# Patient Record
Sex: MALE | Race: WHITE | NOT HISPANIC OR LATINO | Employment: FULL TIME | URBAN - METROPOLITAN AREA
[De-identification: names, ages, dates, MRNs, and addresses within clinical notes are randomized per-mention and may not be internally consistent; named-entity substitution may affect disease eponyms.]

---

## 2019-05-06 ENCOUNTER — OFFICE VISIT (OUTPATIENT)
Dept: URGENT CARE | Facility: CLINIC | Age: 49
End: 2019-05-06
Payer: COMMERCIAL

## 2019-05-06 VITALS
SYSTOLIC BLOOD PRESSURE: 118 MMHG | DIASTOLIC BLOOD PRESSURE: 72 MMHG | RESPIRATION RATE: 16 BRPM | OXYGEN SATURATION: 97 % | BODY MASS INDEX: 32.51 KG/M2 | HEART RATE: 81 BPM | WEIGHT: 240 LBS | HEIGHT: 72 IN | TEMPERATURE: 97.1 F

## 2019-05-06 DIAGNOSIS — M62.830 BACK MUSCLE SPASM: ICD-10-CM

## 2019-05-06 DIAGNOSIS — S39.012A LUMBAR STRAIN, INITIAL ENCOUNTER: ICD-10-CM

## 2019-05-06 PROCEDURE — 99203 OFFICE O/P NEW LOW 30 MIN: CPT | Mod: 25 | Performed by: NURSE PRACTITIONER

## 2019-05-06 RX ORDER — CYCLOBENZAPRINE HCL 10 MG
10 TABLET ORAL 3 TIMES DAILY PRN
Qty: 15 TAB | Refills: 0 | Status: SHIPPED | OUTPATIENT
Start: 2019-05-06 | End: 2019-08-12

## 2019-05-06 RX ORDER — METHYLPREDNISOLONE 4 MG/1
TABLET ORAL
Qty: 21 TAB | Refills: 0 | Status: SHIPPED | OUTPATIENT
Start: 2019-05-06 | End: 2019-08-12

## 2019-05-06 RX ORDER — KETOROLAC TROMETHAMINE 30 MG/ML
60 INJECTION, SOLUTION INTRAMUSCULAR; INTRAVENOUS ONCE
Status: COMPLETED | OUTPATIENT
Start: 2019-05-06 | End: 2019-05-06

## 2019-05-06 RX ADMIN — KETOROLAC TROMETHAMINE 60 MG: 30 INJECTION, SOLUTION INTRAMUSCULAR; INTRAVENOUS at 16:40

## 2019-05-06 ASSESSMENT — ENCOUNTER SYMPTOMS
NUMBNESS: 0
HEADACHES: 0
LEG PAIN: 0
ABDOMINAL PAIN: 0
FEVER: 0
PARESIS: 0
BOWEL INCONTINENCE: 0
BACK PAIN: 1
PERIANAL NUMBNESS: 0
PARESTHESIAS: 0
WEAKNESS: 0
WEIGHT LOSS: 0
TINGLING: 0

## 2019-05-06 ASSESSMENT — PATIENT HEALTH QUESTIONNAIRE - PHQ9: CLINICAL INTERPRETATION OF PHQ2 SCORE: 0

## 2019-05-06 NOTE — PROGRESS NOTES
Subjective:      Toro Graves is a 49 y.o. male who presents with Back Pain (x 2 weeks, possible pulled muscle)    Denies past medical, surgical or family history that is significant to today's problem.   RX or OTC medications-- reviewed with patient today.   Allergies   Allergen Reactions   • Pcn [Penicillins] Anaphylaxis     Per patient             Back Pain   This is a new problem. The current episode started in the past 7 days. The problem occurs constantly. The problem has been gradually worsening since onset. The pain is present in the lumbar spine. The quality of the pain is described as aching. The pain does not radiate. The pain is at a severity of 8/10. The pain is severe. The symptoms are aggravated by twisting. Pertinent negatives include no abdominal pain, bladder incontinence, bowel incontinence, chest pain, dysuria, fever, headaches, leg pain, numbness, paresis, paresthesias, pelvic pain, perianal numbness, tingling, weakness or weight loss. Risk factors include sedentary lifestyle and obesity (denies hx of cancer, recent surgery, trauma or illness). He has tried nothing for the symptoms.       Review of Systems   Constitutional: Negative for fever and weight loss.   Cardiovascular: Negative for chest pain.   Gastrointestinal: Negative for abdominal pain and bowel incontinence.   Genitourinary: Negative for bladder incontinence, dysuria and pelvic pain.   Musculoskeletal: Positive for back pain.   Neurological: Negative for tingling, weakness, numbness, headaches and paresthesias.          Objective:     /72 (BP Location: Left arm, Patient Position: Sitting, BP Cuff Size: Large adult)   Pulse 81   Temp 36.2 °C (97.1 °F) (Temporal)   Resp 16   Ht 1.829 m (6')   Wt 108.9 kg (240 lb)   SpO2 97%   BMI 32.55 kg/m²      Physical Exam   Constitutional: He is oriented to person, place, and time. Vital signs are normal. He appears well-developed and well-nourished. He is cooperative.   Non-toxic appearance. No distress.   HENT:   Head: Normocephalic.   Nose: Nose normal.   Neck: Normal range of motion.   Cardiovascular: Normal rate and regular rhythm.    Pulmonary/Chest: Effort normal and breath sounds normal. No respiratory distress.   Abdominal: Soft. Normal appearance. He exhibits no distension. There is no tenderness.   Musculoskeletal:        Back:         Legs:  Neurological: He is alert and oriented to person, place, and time. He has normal strength. No cranial nerve deficit or sensory deficit. Coordination and gait normal.   Reflex Scores:       Patellar reflexes are 2+ on the right side and 2+ on the left side.  Skin: Skin is warm and dry. Rash noted.   Psychiatric: He has a normal mood and affect. His speech is normal and behavior is normal. Thought content normal.   Nursing note and vitals reviewed.         Toradol given in clinic today. Tolerated well without adverse effects. Advised not to take NSAIDS for 6-8 hours post injection.        Assessment/Plan:     1. Lumbar strain, initial encounter    - MethylPREDNISolone (MEDROL DOSEPAK) 4 MG Tablet Therapy Pack; follow package directions  Dispense: 21 Tab; Refill: 0  - ketorolac (TORADOL) injection 60 mg; 2 mL by Intramuscular route Once.  - cyclobenzaprine (FLEXERIL) 10 MG Tab; Take 1 Tab by mouth 3 times a day as needed for Moderate Pain or Muscle Spasms.  Dispense: 15 Tab; Refill: 0    2. Back muscle spasm    - MethylPREDNISolone (MEDROL DOSEPAK) 4 MG Tablet Therapy Pack; follow package directions  Dispense: 21 Tab; Refill: 0  - ketorolac (TORADOL) injection 60 mg; 2 mL by Intramuscular route Once.  - cyclobenzaprine (FLEXERIL) 10 MG Tab; Take 1 Tab by mouth 3 times a day as needed for Moderate Pain or Muscle Spasms.  Dispense: 15 Tab; Refill: 0    Ice/ heat packs prn  Educated in proper administration of medication(s) ordered today including safety, possible SE, risks, benefits, rationale and alternatives to therapy.     Advised not  to drink ETOH, drive car or operate machinery while taking muscle relaxant RX . Verbalizes understanding of safety instructions.    OTC acetaminophen for breakthrough pain. Dosage and directions per . Do not exceed 3000 mg in 24 hours.     Return to clinic or PCP 5-7  days if current symptoms are not resolving in a satisfactory manner or sooner if new or worsening symptoms occur.   Patient was advised of signs and symptoms which would warrant further evaluation and /or emergent evaluation in ER.  Verbalized agreement with this treatment plan and seemed to understand without barriers. Questions were encouraged and answered to patients satisfaction.     Aftercare instructions were given to pt

## 2019-08-05 ENCOUNTER — TELEPHONE (OUTPATIENT)
Dept: MEDICAL GROUP | Facility: LAB | Age: 49
End: 2019-08-05

## 2019-08-05 NOTE — TELEPHONE ENCOUNTER
NEW PATIENT VISIT PRE-VISIT PLANNING    1.  [employer] Patient is checked in Patient Demographics? YES    2.  Immunizations were updated in [facility] using WebIZ?: Yes       •  Web Iz Recommendations: MMR , TDAP, VARICELLA (Chicken Pox)  and SHINGRIX (Shingles)    3.  Is this appointment scheduled as a Hospital Follow-Up? No    4.  Patient is due for the following Health Maintenance Topics:   Health Maintenance Due   Topic Date Due   • IMM DTaP/Tdap/Td Vaccine (1 - Tdap) 03/08/1988   • COLONOSCOPY  03/08/2019   • IMM ZOSTER VACCINES (1 of 2) 03/08/2019           5. Orders for overdue Health Maintenance topics pended in Pre-Charting? N\A    6.  Reviewed/Updated the following with patient:   •   Preferred Pharmacy? NO       •   Preferred Lab? NO       •   Preferred Communication? NO       •   Allergies? NO       •   Medications? NO       •   Social History? NO       •   Family History (document living status of immediate family members and if + hx of cancer, diabetes, hypertension, hyperlipidemia, heart attack, stroke) NO    7.  Updated Care Team?       •   DME Company (gait device, O2, CPAP, etc.) NO       •   Other Specialists (eye doctor, derm, GYN, cardiology, endo, etc): NO    8.  Patient was informed to arrive 15 min prior to their   scheduled appointment and bring in their medication bottles.

## 2019-08-05 NOTE — LETTER
[facility]  CHERELLE Walden  [address] [address]  [place] [address]  Fax: [phone]   Authorization for Release/Disclosure of   Protected Health Information   Name: MICHAEL CROOK : 1970 SSN: [id number]   Address: [address] [place] [address] [address] [place] [address] Phone:    [phone] (home)    I authorize the entity listed below to release/disclose the PHI below to:   [facility]/CHERELLE Walden   Provider or Entity Name:  Dr. Zi Webber   Address   [facility], [address]  [address] [place] [address] Phone:  [phone]    Fax:  [phone]   Reason for request: continuity of care   Information to be released:    [  ] LAST COLONOSCOPY,  including any PATH REPORT and follow-up  [  ] LAST FIT/COLOGUARD RESULT [  ] LAST DEXA  [  ] LAST MAMMOGRAM  [  ] LAST PAP  [  ] LAST LABS [  ] RETINA EXAM REPORT  [  ] IMMUNIZATION RECORDS  [XXX] Release all info      [  ] Check here and initial the line next to each item to release ALL health information INCLUDING  _____ Care and treatment for drug and / or alcohol abuse  _____ HIV testing, infection status, or AIDS  _____ Genetic Testing    DATES OF SERVICE OR TIME PERIOD TO BE DISCLOSED: _____________  I understand and acknowledge that:  * This Authorization may be revoked at any time by you in writing, except if your health information has already been used or disclosed.  * Your health information that will be used or disclosed as a result of you signing this authorization could be re-disclosed by the recipient. If this occurs, your re-disclosed health information may no longer be protected by State or Federal laws.  * You may refuse to sign this Authorization. Your refusal will not affect your ability to obtain treatment.  * This Authorization becomes effective upon signing and will  on (date) __________.      If no date is indicated, this Authorization will  one (1) year from the signature date.    Name: Michael  Cuauhtemoc Graves    Signature: Continuity of care. Patient has an upcoming appointment on 8/12/19. Thank you.    Date:     8/5/2019       PLEASE FAX REQUESTED RECORDS BACK TO: [phone]

## 2019-08-12 ENCOUNTER — TELEPHONE (OUTPATIENT)
Dept: MEDICAL GROUP | Facility: LAB | Age: 49
End: 2019-08-12

## 2019-08-12 ENCOUNTER — OFFICE VISIT (OUTPATIENT)
Dept: MEDICAL GROUP | Facility: LAB | Age: 49
End: 2019-08-12
Payer: COMMERCIAL

## 2019-08-12 VITALS
WEIGHT: 244 LBS | TEMPERATURE: 97.7 F | DIASTOLIC BLOOD PRESSURE: 66 MMHG | OXYGEN SATURATION: 95 % | HEART RATE: 76 BPM | RESPIRATION RATE: 16 BRPM | BODY MASS INDEX: 33.05 KG/M2 | HEIGHT: 72 IN | SYSTOLIC BLOOD PRESSURE: 110 MMHG

## 2019-08-12 DIAGNOSIS — G89.29 CHRONIC LEFT-SIDED BACK PAIN, UNSPECIFIED BACK LOCATION: ICD-10-CM

## 2019-08-12 DIAGNOSIS — R06.83 SNORING: ICD-10-CM

## 2019-08-12 DIAGNOSIS — M54.9 CHRONIC LEFT-SIDED BACK PAIN, UNSPECIFIED BACK LOCATION: ICD-10-CM

## 2019-08-12 DIAGNOSIS — Z12.11 SCREEN FOR COLON CANCER: ICD-10-CM

## 2019-08-12 DIAGNOSIS — E66.9 OBESITY (BMI 30-39.9): ICD-10-CM

## 2019-08-12 DIAGNOSIS — Z23 NEED FOR VACCINATION: ICD-10-CM

## 2019-08-12 DIAGNOSIS — J30.89 NON-SEASONAL ALLERGIC RHINITIS, UNSPECIFIED TRIGGER: ICD-10-CM

## 2019-08-12 PROCEDURE — 99214 OFFICE O/P EST MOD 30 MIN: CPT | Mod: 25 | Performed by: NURSE PRACTITIONER

## 2019-08-12 PROCEDURE — 90471 IMMUNIZATION ADMIN: CPT | Performed by: NURSE PRACTITIONER

## 2019-08-12 PROCEDURE — 90715 TDAP VACCINE 7 YRS/> IM: CPT | Performed by: NURSE PRACTITIONER

## 2019-08-12 RX ORDER — FLUTICASONE PROPIONATE 50 MCG
2 SPRAY, SUSPENSION (ML) NASAL DAILY
Qty: 1 BOTTLE | Refills: 11 | Status: SHIPPED | OUTPATIENT
Start: 2019-08-12 | End: 2019-10-14

## 2019-08-12 NOTE — ASSESSMENT & PLAN NOTE
This is a follow-up on a chronic problem. The current episode started 3 months ago. The problem occurs constantly. The problem has been  stable since onset. The pain is present in the lumbar spine more so on the left than the right. The quality of the pain is described as aching. The pain does not radiate. The pain is at a severity of 6/10. The symptoms are aggravated by sitting, walking twisting. Pertinent negatives include no abdominal pain, bladder incontinence, bowel incontinence, chest pain, dysuria, fever, headaches, leg pain, numbness, paresis, paresthesias, pelvic pain, perianal numbness, tingling, weakness or weight loss. Risk factors include sedentary lifestyle and obesity.  He has tried muscle relaxers with very little relief.  He has not tried using ice, heat, massage, acupuncture or chiropractic.

## 2019-08-12 NOTE — ASSESSMENT & PLAN NOTE
This is a chronic problem for which patient's wife is very concerned for.  She reports that he does have episodes where he will stop breathing in his sleep and that he snores so loudly he can be heard from the other room.  He does report daytime sleepiness and fatigue.  ROS is NEGATIVE for generalized weakness, headaches upon awakening, , dyspnea, tachypnea, respiratory distress, cyanotic skin changes.

## 2019-08-12 NOTE — ASSESSMENT & PLAN NOTE
This is a chronic problem for patient.  He reports that he has been struggling with his allergies.  ROS is positive for increased lacrimation, b/l itchy eyes,  rhinorrhea, post-nasal drip, sneezing.  He denies wheezing, dyspnea, respiratory distress, palpitations, tachycardia, generalized pruritis, rash/hives.  He does not take anything regularly but occasionally takes a Claritin.

## 2019-08-12 NOTE — TELEPHONE ENCOUNTER
• Cologuard order received from Natalia Quiles.     • All appropriate fields completed by Medical Assistant: Yes    • Paperwork faxed and scanned into her chart

## 2019-08-12 NOTE — PROGRESS NOTES
CC:Diagnoses of Chronic left-sided back pain, unspecified back location, Snoring, Non-seasonal allergic rhinitis, unspecified trigger, Obesity (BMI 30-39.9), Screen for colon cancer, and Need for vaccination were pertinent to this visit.    HISTORY OF PRESENT ILLNESS: Toro Graves is a 49 y.o. male established patient who presents today to discuss the following problems:    Chronic left-sided back pain  This is a follow-up on a chronic problem. The current episode started 3 months ago. The problem occurs constantly. The problem has been  stable since onset. The pain is present in the lumbar spine more so on the left than the right. The quality of the pain is described as aching. The pain does not radiate. The pain is at a severity of 6/10. The symptoms are aggravated by sitting, walking twisting. Pertinent negatives include no abdominal pain, bladder incontinence, bowel incontinence, chest pain, dysuria, fever, headaches, leg pain, numbness, paresis, paresthesias, pelvic pain, perianal numbness, tingling, weakness or weight loss. Risk factors include sedentary lifestyle and obesity.  He has tried muscle relaxers with very little relief.  He has not tried using ice, heat, massage, acupuncture or chiropractic.    Snoring  This is a chronic problem for which patient's wife is very concerned for.  She reports that he does have episodes where he will stop breathing in his sleep and that he snores so loudly he can be heard from the other room.  He does report daytime sleepiness and fatigue.  ROS is NEGATIVE for generalized weakness, headaches upon awakening, , dyspnea, tachypnea, respiratory distress, cyanotic skin changes.        Obesity (BMI 30-39.9)  Patient and I discussed the importance of lifestyle changes, with particular emphasis on decreasing sugar and carbohydrate intake and increasing plant-based nutrition (for the purposes of weight loss, general health, and prevention of chronic illnesses), as  well as regular cardiovascular exercise, proper sleep, and stress management. Patient verbalized understanding.      Non-seasonal allergic rhinitis  This is a chronic problem for patient.  He reports that he has been struggling with his allergies.  ROS is positive for increased lacrimation, b/l itchy eyes,  rhinorrhea, post-nasal drip, sneezing.  He denies wheezing, dyspnea, respiratory distress, palpitations, tachycardia, generalized pruritis, rash/hives.  He does not take anything regularly but occasionally takes a Claritin.        Health Maintenance: Completed    Patient Active Problem List    Diagnosis Date Noted   • Chronic left-sided back pain 08/12/2019   • Snoring 08/12/2019   • Non-seasonal allergic rhinitis 08/12/2019   • Obesity (BMI 30-39.9) 08/12/2019        Allergies:Pcn [penicillins]    Current Outpatient Medications   Medication Sig Dispense Refill   • fluticasone (FLONASE) 50 MCG/ACT nasal spray Spray 2 Sprays in nose every day. 1 Bottle 11     No current facility-administered medications for this visit.        Social History     Tobacco Use   • Smoking status: Former Smoker     Last attempt to quit: 2009     Years since quitting: 10.6   • Smokeless tobacco: Never Used   Substance Use Topics   • Alcohol use: No   • Drug use: No     Social History     Social History Narrative   • Not on file       Family History   Problem Relation Age of Onset   • No Known Problems Mother    • Diabetes Father    • Heart Disease Father    • No Known Problems Brother    • No Known Problems Brother        ROS:     No fevers or weight loss  No headaches or sore throat  No chest pain or shortness of breath  No bowel changes  No lower extremity edema  No suicidal ideation or panic attack          EXAM:   /66 (BP Location: Right arm, Patient Position: Sitting, BP Cuff Size: Adult)   Pulse 76   Temp 36.5 °C (97.7 °F) (Temporal)   Resp 16   Ht 1.829 m (6')   Wt 110.7 kg (244 lb)   SpO2 95%  Body mass index is  33.09 kg/m².    Gen:         Obese.  Alert and oriented, No apparent distress.  Neck:        No Lymphadenopathy or Bruits.  Lungs:     Clear to auscultation bilaterally  CV:          Regular rate and rhythm. No murmurs, rubs or gallops.               Ext:          No clubbing, cyanosis, edema.  SPINE: No significant spinal curvature on forward bend. Mild tenderness in paraspinous muscles lumbar spine without current spasm. SLT negative. DTR 2+ patella, 1+ achilles bilaterally. Strength 5/5 proximal and distal LE.  No pain with stress of SI. Full hip ROM. Poor hamstring flexibility. No symptoms with axial loading.              ASSESSMENT/PLAN:    1. Chronic left-sided back pain, unspecified back location  Chronic stable. No back red flags.Patient given conservative care instructions (NSAIDs, stretching, warm & cold compresses, OTC oral and topical analgesics, and avoiding aggravating factors)     - REFERRAL TO PHYSICAL THERAPY Reason for Therapy: Eval/Treat/Report  - DX-LUMBAR SPINE-2 OR 3 VIEWS; Future    2. Snoring  New to discuss.  Wife reporting significant periods of apnea during sleep. Symptomatic.   - REFERRAL TO SLEEP STUDIES  - CBC WITH DIFFERENTIAL; Future  - Comp Metabolic Panel; Future    3. Non-seasonal allergic rhinitis, unspecified trigger  Uncontrolled.  Patient given conservative care instructions regarding allergies (OTC antihistamines, nasal sprays [anti-histamines, steroids], nasal saline rinses, hydration, cautious use of nasal decongestants like pseudoephedrine).  Patient verbalizes understanding.    - fluticasone (FLONASE) 50 MCG/ACT nasal spray; Spray 2 Sprays in nose every day.  Dispense: 1 Bottle; Refill: 11    4. Obesity (BMI 30-39.9)  - Patient identified as having weight management issue.  Appropriate orders and counseling given.  - CBC WITH DIFFERENTIAL; Future  - Comp Metabolic Panel; Future  - HEMOGLOBIN A1C; Future  - Lipid Profile; Future    5. Screen for colon cancer  - COLOGROCIO  (FIT DNA)    6. Need for vaccination  - Tdap Vaccine =>6YO IM    Return in about 2 months (around 10/12/2019).       Please note that this dictation was created using voice recognition software. I have made every reasonable attempt to correct obvious errors, but I expect that there are errors of grammar and possibly content that I did not discover before finalizing the note.

## 2019-08-13 ENCOUNTER — HOSPITAL ENCOUNTER (OUTPATIENT)
Dept: LAB | Facility: MEDICAL CENTER | Age: 49
End: 2019-08-13
Attending: NURSE PRACTITIONER
Payer: COMMERCIAL

## 2019-08-13 DIAGNOSIS — E66.9 OBESITY (BMI 30-39.9): ICD-10-CM

## 2019-08-13 LAB
ALBUMIN SERPL BCP-MCNC: 4.2 G/DL (ref 3.2–4.9)
ALBUMIN/GLOB SERPL: 1.4 G/DL
ALP SERPL-CCNC: 87 U/L (ref 30–99)
ALT SERPL-CCNC: 28 U/L (ref 2–50)
ANION GAP SERPL CALC-SCNC: 8 MMOL/L (ref 0–11.9)
AST SERPL-CCNC: 19 U/L (ref 12–45)
BASOPHILS # BLD AUTO: 0.6 % (ref 0–1.8)
BASOPHILS # BLD: 0.04 K/UL (ref 0–0.12)
BILIRUB SERPL-MCNC: 0.5 MG/DL (ref 0.1–1.5)
BUN SERPL-MCNC: 12 MG/DL (ref 8–22)
CALCIUM SERPL-MCNC: 9.1 MG/DL (ref 8.5–10.5)
CHLORIDE SERPL-SCNC: 108 MMOL/L (ref 96–112)
CHOLEST SERPL-MCNC: 177 MG/DL (ref 100–199)
CO2 SERPL-SCNC: 23 MMOL/L (ref 20–33)
CREAT SERPL-MCNC: 1.03 MG/DL (ref 0.5–1.4)
EOSINOPHIL # BLD AUTO: 0.27 K/UL (ref 0–0.51)
EOSINOPHIL NFR BLD: 3.9 % (ref 0–6.9)
ERYTHROCYTE [DISTWIDTH] IN BLOOD BY AUTOMATED COUNT: 35.8 FL (ref 35.9–50)
EST. AVERAGE GLUCOSE BLD GHB EST-MCNC: 108 MG/DL
FASTING STATUS PATIENT QL REPORTED: NORMAL
GLOBULIN SER CALC-MCNC: 3.1 G/DL (ref 1.9–3.5)
GLUCOSE SERPL-MCNC: 104 MG/DL (ref 65–99)
HBA1C MFR BLD: 5.4 % (ref 0–5.6)
HCT VFR BLD AUTO: 44.4 % (ref 42–52)
HDLC SERPL-MCNC: 32 MG/DL
HGB BLD-MCNC: 15.1 G/DL (ref 14–18)
IMM GRANULOCYTES # BLD AUTO: 0.01 K/UL (ref 0–0.11)
IMM GRANULOCYTES NFR BLD AUTO: 0.1 % (ref 0–0.9)
LDLC SERPL CALC-MCNC: 101 MG/DL
LYMPHOCYTES # BLD AUTO: 0.96 K/UL (ref 1–4.8)
LYMPHOCYTES NFR BLD: 13.7 % (ref 22–41)
MCH RBC QN AUTO: 28.7 PG (ref 27–33)
MCHC RBC AUTO-ENTMCNC: 34 G/DL (ref 33.7–35.3)
MCV RBC AUTO: 84.4 FL (ref 81.4–97.8)
MONOCYTES # BLD AUTO: 0.53 K/UL (ref 0–0.85)
MONOCYTES NFR BLD AUTO: 7.6 % (ref 0–13.4)
NEUTROPHILS # BLD AUTO: 5.19 K/UL (ref 1.82–7.42)
NEUTROPHILS NFR BLD: 74.1 % (ref 44–72)
NRBC # BLD AUTO: 0 K/UL
NRBC BLD-RTO: 0 /100 WBC
PLATELET # BLD AUTO: 296 K/UL (ref 164–446)
PMV BLD AUTO: 9.2 FL (ref 9–12.9)
POTASSIUM SERPL-SCNC: 4.2 MMOL/L (ref 3.6–5.5)
PROT SERPL-MCNC: 7.3 G/DL (ref 6–8.2)
RBC # BLD AUTO: 5.26 M/UL (ref 4.7–6.1)
SODIUM SERPL-SCNC: 139 MMOL/L (ref 135–145)
TRIGL SERPL-MCNC: 219 MG/DL (ref 0–149)
WBC # BLD AUTO: 7 K/UL (ref 4.8–10.8)

## 2019-08-13 PROCEDURE — 36415 COLL VENOUS BLD VENIPUNCTURE: CPT

## 2019-08-13 PROCEDURE — 80061 LIPID PANEL: CPT

## 2019-08-13 PROCEDURE — 80053 COMPREHEN METABOLIC PANEL: CPT

## 2019-08-13 PROCEDURE — 83036 HEMOGLOBIN GLYCOSYLATED A1C: CPT

## 2019-08-13 PROCEDURE — 85025 COMPLETE CBC W/AUTO DIFF WBC: CPT

## 2019-08-18 ENCOUNTER — HOSPITAL ENCOUNTER (OUTPATIENT)
Dept: RADIOLOGY | Facility: MEDICAL CENTER | Age: 49
End: 2019-08-18
Attending: NURSE PRACTITIONER
Payer: COMMERCIAL

## 2019-08-18 DIAGNOSIS — G89.29 CHRONIC LEFT-SIDED BACK PAIN, UNSPECIFIED BACK LOCATION: ICD-10-CM

## 2019-08-18 DIAGNOSIS — M54.9 CHRONIC LEFT-SIDED BACK PAIN, UNSPECIFIED BACK LOCATION: ICD-10-CM

## 2019-08-18 PROCEDURE — 72100 X-RAY EXAM L-S SPINE 2/3 VWS: CPT

## 2019-10-11 ENCOUNTER — IMMUNIZATION (OUTPATIENT)
Dept: SOCIAL WORK | Facility: CLINIC | Age: 49
End: 2019-10-11
Payer: COMMERCIAL

## 2019-10-11 DIAGNOSIS — Z23 NEED FOR VACCINATION: ICD-10-CM

## 2019-10-11 PROCEDURE — 90471 IMMUNIZATION ADMIN: CPT | Performed by: REGISTERED NURSE

## 2019-10-11 PROCEDURE — 90686 IIV4 VACC NO PRSV 0.5 ML IM: CPT | Performed by: REGISTERED NURSE

## 2019-10-14 ENCOUNTER — OFFICE VISIT (OUTPATIENT)
Dept: MEDICAL GROUP | Facility: LAB | Age: 49
End: 2019-10-14
Payer: COMMERCIAL

## 2019-10-14 VITALS
DIASTOLIC BLOOD PRESSURE: 70 MMHG | SYSTOLIC BLOOD PRESSURE: 122 MMHG | TEMPERATURE: 98.4 F | RESPIRATION RATE: 18 BRPM | OXYGEN SATURATION: 97 % | WEIGHT: 243.8 LBS | HEIGHT: 72 IN | BODY MASS INDEX: 33.02 KG/M2 | HEART RATE: 72 BPM

## 2019-10-14 DIAGNOSIS — G89.29 CHRONIC LEFT-SIDED BACK PAIN, UNSPECIFIED BACK LOCATION: ICD-10-CM

## 2019-10-14 DIAGNOSIS — J30.89 NON-SEASONAL ALLERGIC RHINITIS, UNSPECIFIED TRIGGER: ICD-10-CM

## 2019-10-14 DIAGNOSIS — R06.83 SNORING: ICD-10-CM

## 2019-10-14 DIAGNOSIS — M54.9 CHRONIC LEFT-SIDED BACK PAIN, UNSPECIFIED BACK LOCATION: ICD-10-CM

## 2019-10-14 PROCEDURE — 99214 OFFICE O/P EST MOD 30 MIN: CPT | Performed by: NURSE PRACTITIONER

## 2019-10-14 NOTE — ASSESSMENT & PLAN NOTE
Patient is here today to follow up on his back pain.  Patient reports since his prior visit that his pain is much improved and in fact is mostly resolved.  He did do physical therapy which helped greatly and was prescribed home exercises.  He has been doing home exercises and these have been very beneficial.  He would like to continue using conservative measures and will follow-up if pain returns.

## 2019-10-14 NOTE — PROGRESS NOTES
CC:Diagnoses of Chronic left-sided back pain, unspecified back location, Snoring, and Non-seasonal allergic rhinitis, unspecified trigger were pertinent to this visit.    HISTORY OF PRESENT ILLNESS: Toro Graves is a 49 y.o. male established patient who presents today to discuss the following problems:    Chronic left-sided back pain  Patient is here today to follow up on his back pain.  Patient reports since his prior visit that his pain is much improved and in fact is mostly resolved.  He did do physical therapy which helped greatly and was prescribed home exercises.  He has been doing home exercises and these have been very beneficial.  He would like to continue using conservative measures and will follow-up if pain returns.    Snoring  There is a chronic problem for which patient has appt 11/25 for consultation with sleep studies.  He does have daytime sleepiness and fatigue.  Per the patient's wife, his snoring is very severe and can be heard from the other room.  He denies any generalized weakness, morning headaches, dyspnea, tachypnea, respiratory distress or cyanotic skin changes.      Non-seasonal allergic rhinitis  There is a chronic stable problem.  I had prescribed Flonase for him at his last visit but he reports that this actually caused headaches for him.  He has instead been taking Claritin regularly and symptoms are still poorly controlled.  His most bothersome symptom is rhinorrhea, postnasal drip and sneezing.  He denies any wheezing, dyspnea, respiratory distress, palpitations, tachycardia, generalized pruritus, rash or hives.      Health Maintenance: Completed    Patient Active Problem List    Diagnosis Date Noted   • Chronic left-sided back pain 08/12/2019   • Snoring 08/12/2019   • Non-seasonal allergic rhinitis 08/12/2019   • Obesity (BMI 30-39.9) 08/12/2019        Allergies:Pcn [penicillins]    No current outpatient medications on file.     No current facility-administered  medications for this visit.        Social History     Tobacco Use   • Smoking status: Former Smoker     Last attempt to quit: 2009     Years since quitting: 10.7   • Smokeless tobacco: Never Used   Substance Use Topics   • Alcohol use: No   • Drug use: No     Social History     Social History Narrative   • Not on file       Family History   Problem Relation Age of Onset   • No Known Problems Mother    • Diabetes Father    • Heart Disease Father    • No Known Problems Brother    • No Known Problems Brother        ROS:   No fevers or weight loss  No headaches or sore throat  No chest pain or shortness of breath  No bowel changes  No lower extremity edema  No suicidal ideation or panic attack        EXAM:   /70 (BP Location: Right arm, Patient Position: Sitting, BP Cuff Size: Adult)   Pulse 72   Temp 36.9 °C (98.4 °F) (Temporal)   Resp 18   Ht 1.829 m (6')   Wt 110.6 kg (243 lb 12.8 oz)   SpO2 97%  Body mass index is 33.07 kg/m².    Constitutional: Well-developed and well-nourished. Not diaphoretic. No distress.   Skin: Skin is warm and dry. No rash noted.  Head: Atraumatic without lesions.  Neck: Supple, trachea midline. No thyromegaly present. No cervical or supraclavicular lymphadenopathy.  Cardiovascular: Regular rate and rhythm.   Chest: Effort normal. Clear to auscultation throughout. No adventitious sounds.   Abdomen: Soft, non tender, and without distention. Active bowel sounds in all four quadrants. No rebound, guarding, masses or hepatosplenomegaly.  Extremities: No cyanosis, clubbing, erythema, nor edema.   Neurological: Alert and oriented x 3. Sensation intact.   Psychiatric:  Behavior, mood, and affect are appropriate.       ASSESSMENT/PLAN:    1. Chronic left-sided back pain, unspecified back location  Chronic improved with PT.  Continue home exercises. Patient given conservative care instructions (NSAIDs, stretching, warm & cold compresses, OTC oral and topical analgesics, and avoiding  aggravating factors) as well as instructions for stretches regarding back pain.      2. Snoring  Chronic uncontrolled. Appt in November with sleep studies.      3. Non-seasonal allergic rhinitis, unspecified trigger  Chronic. Uncontrolled.  Patient given conservative care instructions regarding allergies (OTC antihistamines, nasal sprays [anti-histamines, steroids], nasal saline rinses, hydration, cautious use of nasal decongestants like pseudoephedrine).  Patient verbalizes understanding.      Return in about 6 months (around 4/14/2020) for Routine Follow up.       Please note that this dictation was created using voice recognition software. I have made every reasonable attempt to correct obvious errors, but I expect that there are errors of grammar and possibly content that I did not discover before finalizing the note.

## 2019-10-14 NOTE — ASSESSMENT & PLAN NOTE
There is a chronic problem for which patient has appt 11/25 for consultation with sleep studies.  He does have daytime sleepiness and fatigue.  Per the patient's wife, his snoring is very severe and can be heard from the other room.  He denies any generalized weakness, morning headaches, dyspnea, tachypnea, respiratory distress or cyanotic skin changes.

## 2019-10-15 NOTE — ASSESSMENT & PLAN NOTE
There is a chronic stable problem.  I had prescribed Flonase for him at his last visit but he reports that this actually caused headaches for him.  He has instead been taking Claritin regularly and symptoms are still poorly controlled.  His most bothersome symptom is rhinorrhea, postnasal drip and sneezing.  He denies any wheezing, dyspnea, respiratory distress, palpitations, tachycardia, generalized pruritus, rash or hives.

## 2019-11-22 PROBLEM — Z87.891 FORMER SMOKER: Status: ACTIVE | Noted: 2019-11-22

## 2019-11-22 PROBLEM — Z92.29 UP TO DATE WITH INFLUENZA VACCINATION: Status: ACTIVE | Noted: 2019-11-22

## 2019-11-22 PROBLEM — G47.33 OSA (OBSTRUCTIVE SLEEP APNEA): Status: ACTIVE | Noted: 2019-11-22

## 2019-11-22 NOTE — PROGRESS NOTES
"CC: Evaluation of obstructive sleep apnea syndrome    HPI:  Mr. Toro Graves is a 49-year-old  kindly referred by CHERELLE Sexton for evaluation of obstructive sleep apnea syndrome.    He generally works 3 PM to 11:30 PM.  He has a regular bed partner.  His wife indicates that he snores loudly.  He did tell his PCP that he experienced daytime sleepiness and fatigue.  However, he denies being tired to me today.     His wife describes loud snoring, witnessed apneas, and resuscitative snorts for over 20 years.    Review of his sleep diary shows no napping.  He awakened feeling \"okay\" each and every morning.  He estimates that his sleep time varied between 7 to about 9 hours.    His total [place] score is a normal 1 out of 24    Significant comorbidities and modifying factors include back pain, bronchitis, nonseasonal allergic rhinitis, snoring, and up-to-date with influenza vaccination.      Patient Active Problem List    Diagnosis Date Noted   • MICHAEL (obstructive sleep apnea) 11/22/2019   • Former smoker 11/22/2019   • Up to date with influenza vaccination 11/22/2019   • Chronic left-sided back pain 08/12/2019   • Snoring 08/12/2019   • Non-seasonal allergic rhinitis 08/12/2019   • Obesity (BMI 30-39.9) 08/12/2019       Past Medical History:   Diagnosis Date   • Allergy    • Asthma    • Bronchitis         History reviewed. No pertinent surgical history.    Family History   Problem Relation Age of Onset   • No Known Problems Mother    • Diabetes Father    • Heart Disease Father    • No Known Problems Brother    • No Known Problems Brother        Social History     Socioeconomic History   • Marital status:      Spouse name: Not on file   • Number of children: Not on file   • Years of education: Not on file   • Highest education level: Not on file   Occupational History   • Not on file   Social Needs   • Financial resource strain: Not on file   • Food insecurity:     Worry: Not on file     " "Inability: Not on file   • Transportation needs:     Medical: Not on file     Non-medical: Not on file   Tobacco Use   • Smoking status: Former Smoker     Packs/day: 1.00     Years: 30.00     Pack years: 30.00     Types: Cigarettes     Last attempt to quit: 2009     Years since quitting: 10.9   • Smokeless tobacco: Never Used   Substance and Sexual Activity   • Alcohol use: No   • Drug use: No   • Sexual activity: Yes   Lifestyle   • Physical activity:     Days per week: Not on file     Minutes per session: Not on file   • Stress: Not on file   Relationships   • Social connections:     Talks on phone: Not on file     Gets together: Not on file     Attends Sikhism service: Not on file     Active member of club or organization: Not on file     Attends meetings of clubs or organizations: Not on file     Relationship status: Not on file   • Intimate partner violence:     Fear of current or ex partner: Not on file     Emotionally abused: Not on file     Physically abused: Not on file     Forced sexual activity: Not on file   Other Topics Concern   • Not on file   Social History Narrative   • Not on file       Current Outpatient Medications   Medication Sig Dispense Refill   • zolpidem (AMBIEN) 5 MG Tab Take 1 Tab by mouth at bedtime as needed for Sleep (1 to 2 po qhs prn insomnia/sleep study. Bring to sleep study.) for up to 2 doses. 2 Tab 0     No current facility-administered medications for this visit.     \"CURRENT RX\"    ALLERGIES: Pcn [penicillins]    ROS    Constitutional: Denies fever, chills, sweats,  weight loss, fatigue.  Eyes: Denies vision loss, pain, drainage, double vision, wears glasses.  Ears/Nose/Mouth/Throat: Denies earache, difficulty hearing, rhinitis/nasal congestion, injury, recurrent sore throat, persistent hoarseness, decayed teeth/toothaches, ringing or buzzing in the ears.  Cardiovascular: Denies chest pain, tightness, palpitations, swelling in legs/feet, fainting, difficulty breathing when " lying down but gets better when sitting up.   Respiratory: Denies shortness of breath, cough, sputum, wheezing, painful breathing, coughing up blood.   Sleep: per HPI  Gastrointestinal: Denies  difficulty swallowing, nausea, abdominal pain, diarrhea, constipation, heartburn.  Genitourinary: Denies  blood in urine, discharge, frequent urination.   Musculoskeletal: Denies painful joints, sore muscles, back pain.   Integumentary: Denies rashes, lumps, color changes.   Neurological: Denies frequent headaches,weakness, dizziness.    PHYSICAL EXAM  Obese    /70 (BP Location: Right arm, Patient Position: Sitting, BP Cuff Size: Adult)   Pulse 80   Resp 16   Ht 1.829 m (6')   Wt 113.9 kg (251 lb)   SpO2 96%   BMI 34.04 kg/m²   Appearance: Well-nourished, well-developed, no acute distress  Eyes:  PERRLA, EOMI; glasses  ENMT: without lesions, deformities;hearing grossly intact; tongue normal, posterior pharynx without erythema or exudate;   Modified Mallampati (AKA Coppola) Score: 2-3  Neck: Supple, trachea midline, no masses  Respiratory effort:  No intercostal retractions or use of accessory muscles  Lung auscultation:  No wheezes rhonchi rubs or rales  Cardiac: No murmurs, rubs, or gallops; regular rhythm, normal rate; no edema  Abdomen:  No tenderness, no organomegaly.  Obese  Musculoskeletal:  Grossly normal; gait and station normal; digits and nails normal  Skin:  No rashes, petechiae, cyanosis  Neurologic: without focal signs; oriented to person, time, place, and purpose; judgement intact  Psychiatric:  No depression, anxiety, agitation        PROBLEMS:  1. MICHAEL (obstructive sleep apnea)    - zolpidem (AMBIEN) 5 MG Tab; Take 1 Tab by mouth at bedtime as needed for Sleep (1 to 2 po qhs prn insomnia/sleep study. Bring to sleep study.) for up to 2 doses.  Dispense: 2 Tab; Refill: 0  - Polysomnography, 4 or More; Future    2. Obesity (BMI 30-39.9)    - OBESITY COUNSELING (No Charge): Patient identified as having  weight management issue.  Appropriate orders and counseling given.    3. Former smoker      4. Up to date with influenza vaccination      5. Chronic left-sided back pain, unspecified back location      PLAN:   The patient has signs and symptoms consistent with obstructive sleep apnea hypopnea syndrome. Will schedule to have a nocturnal polysomnogram using zolpidem to assist with sleep onset and maintenance should the need arise. Will return after the results are available to determine further diagnostic needs and/or treatment options.    The risks of untreated sleep apnea were discussed with the patient at length. Patients with MICHAEL are at increased risk of cardiovascular disease including coronary artery disease, systemic arterial hypertension, pulmonary arterial hypertension, cardiac arrythmias, and stroke. MICHAEL patients have an increased risk of motor vehicle accidents, type 2 diabetes, chronic kidney disease, and non-alcoholic liver disease. The patient was advised to avoid driving a motor vehicle when drowsy.    Positive airway pressure, such as CPAP, is considered first-line and preferred therapy for sleep apnea and may reverse both symptoms and risks.    Have advised the patient to follow up with the appropriate healthcare practitioners for all other medical problems and issues.      Return for after sleep study.

## 2019-11-25 ENCOUNTER — SLEEP CENTER VISIT (OUTPATIENT)
Dept: SLEEP MEDICINE | Facility: MEDICAL CENTER | Age: 49
End: 2019-11-25
Payer: COMMERCIAL

## 2019-11-25 VITALS
HEART RATE: 80 BPM | RESPIRATION RATE: 16 BRPM | HEIGHT: 72 IN | OXYGEN SATURATION: 96 % | SYSTOLIC BLOOD PRESSURE: 118 MMHG | WEIGHT: 251 LBS | DIASTOLIC BLOOD PRESSURE: 70 MMHG | BODY MASS INDEX: 34 KG/M2

## 2019-11-25 DIAGNOSIS — G47.33 OSA (OBSTRUCTIVE SLEEP APNEA): ICD-10-CM

## 2019-11-25 DIAGNOSIS — M54.9 CHRONIC LEFT-SIDED BACK PAIN, UNSPECIFIED BACK LOCATION: ICD-10-CM

## 2019-11-25 DIAGNOSIS — E66.9 OBESITY (BMI 30-39.9): ICD-10-CM

## 2019-11-25 DIAGNOSIS — G89.29 CHRONIC LEFT-SIDED BACK PAIN, UNSPECIFIED BACK LOCATION: ICD-10-CM

## 2019-11-25 DIAGNOSIS — Z87.891 FORMER SMOKER: ICD-10-CM

## 2019-11-25 DIAGNOSIS — Z92.29 UP TO DATE WITH INFLUENZA VACCINATION: ICD-10-CM

## 2019-11-25 PROCEDURE — 99244 OFF/OP CNSLTJ NEW/EST MOD 40: CPT | Performed by: INTERNAL MEDICINE

## 2019-11-25 RX ORDER — ZOLPIDEM TARTRATE 5 MG/1
5 TABLET ORAL NIGHTLY PRN
Qty: 2 TAB | Refills: 0 | Status: SHIPPED | OUTPATIENT
Start: 2019-11-25 | End: 2020-03-09

## 2019-12-29 ENCOUNTER — SLEEP STUDY (OUTPATIENT)
Dept: SLEEP MEDICINE | Facility: MEDICAL CENTER | Age: 49
End: 2019-12-29
Attending: INTERNAL MEDICINE
Payer: COMMERCIAL

## 2019-12-29 DIAGNOSIS — G47.33 OSA (OBSTRUCTIVE SLEEP APNEA): ICD-10-CM

## 2019-12-29 PROCEDURE — 95811 POLYSOM 6/>YRS CPAP 4/> PARM: CPT | Performed by: INTERNAL MEDICINE

## 2019-12-30 NOTE — PROCEDURES
Clinical Comments:    The patient underwent a split night polysomnogram with a CPAP titration using the standard montage for measurement of paramaters of sleep, respiratory events, movement abnormalities, heart rate and rhythm.  A Microphone was used to monitior snoring.         DIAGNOSTIC:  Analysis:  Study start time was 09:54:38 PM.  Total recording time was 2h 16.5m (136 minutes) with a total sleep time of 1h 57.5m (117 minutes) resulting in a sleep efficiency of 86.08%.  Sleep latency from the start fo the study was 02 minutes minutes and REM latency from sleep onset was 117 minutes minutes.  Respiratory:   There were 0 apneas in total consisting of 0 obstructive apneas, 0 mixed apneas, and 0 central apneas.  There were 32 hypopneas in total.  The apnea index was 0.00 per hour and the hypopnea index was 16.34 per hour.  The overall AHI was 16.3, with a REM AHI of 75.79, and a supine AHI of 16.34.  Limb Movements:  There were a total of 17 periodic leg movements, of which 11 were PLMS arousals.  This resulted in a PLMS index of 8.7 and a PLMS arousal index of 5.6  Oximetry:  The mean SaO2 was 94.0% for the diagnostic portion of the study, with a minimum SaO2 of 79.0%.             TREATMENT:  Analysis:  Treatment recording time was 5h 52.0m (352 minutes) with a total sleep time of 5h 7.5m (307 minutes) resulting in a sleep efficiency of 87.4%.    Sleep latency from the start of treatment was 27 minutes minutes and REM latency from sleep onset was 1h 13.5m minutes.    The patient had 50 arousals in total for an arousal index of 9.8.  Respiratory:   There were 3 apneas in total consisting of  1 obstructive apneas, 2 central apneas, and 0 mixed apneas for an apnea index of 0.59.    The patient had 12 hypopneas in total, which resulted in a hypopnea index of 2.34.    The overall AHI was 2.93, with a REM AHI of 8.22, and a supine AHI of 3.23.     Limb Movements:  There were a total of 0 periodic leg movements, of  which 0 were PLMS arousals.  This resulted in a PLMS index of 0.0 and a PLMS arousal index of 0.0.  Oximetry:  The mean SaO2 during treatment was 95.0%, with a minimum oxygen saturation of 86.0%.      Interpretation:    Mr. Graves presented with snoring and witnessed nocturnal apnea.  This is a split-night study.    In the diagnostic phase there is fragmentation of sleep but 9 minutes of REM sleep time are included.  There are frequent periodic limb movements and the periodic limb movement with arousal index is 4.6 events per hour.  Sleep onset latency is short, suggesting possible sleep deprivation.  The apnea hypopnea index is 16.3 events per hour, consisting exclusively of hypopnea episodes and all of the diagnostic study was done in the supine body position.  The lowest arterial oxygen saturation is 81% on room air and he spent 4% of the diagnostic time with a saturation below 90%.  The heart rate varied from 53-85 bpm.  Light snoring was noted.    In the treatment phase of the study there was improved continuity of sleep and 73 minutes of REM sleep time are included.  CPAP was applied at 5-9 cm water pressure with persistence of hypopnea events.  Very rare treatment onset central apnea episodes were noted.  BiPAP was applied at 12-13/8-9 cm water pressure.  In the final pressure stage the apnea hypopnea index was 1.3 events per hour with a lowest arterial oxygen saturation of 93% on room air.  That step in the titration included 16 minutes of REM sleep time and some supine body position time.    Assessment:  Mild to moderate obstructive sleep apnea hypopnea with an apnea hypopnea index of 16.3 events per hour and a lowest arterial oxygen saturation of 81% on room air.  Fragmentation of sleep related to the breathing events and to periodic limb movements with both improved on treatment.  There is an excellent response to BiPAP therapy.    Recommendations:  BiPAP at 13/9 cm water pressure.  He did best with a  medium Dreamware fullface mask.

## 2020-01-20 ENCOUNTER — SLEEP CENTER VISIT (OUTPATIENT)
Dept: SLEEP MEDICINE | Facility: MEDICAL CENTER | Age: 50
End: 2020-01-20
Payer: COMMERCIAL

## 2020-01-20 VITALS
WEIGHT: 249 LBS | SYSTOLIC BLOOD PRESSURE: 128 MMHG | BODY MASS INDEX: 33.72 KG/M2 | HEIGHT: 72 IN | HEART RATE: 84 BPM | OXYGEN SATURATION: 96 % | DIASTOLIC BLOOD PRESSURE: 62 MMHG

## 2020-01-20 DIAGNOSIS — G47.33 OSA (OBSTRUCTIVE SLEEP APNEA): ICD-10-CM

## 2020-01-20 DIAGNOSIS — E66.9 OBESITY (BMI 30-39.9): ICD-10-CM

## 2020-01-20 PROCEDURE — 99214 OFFICE O/P EST MOD 30 MIN: CPT | Performed by: NURSE PRACTITIONER

## 2020-01-20 NOTE — PATIENT INSTRUCTIONS
1) Start ** at **cmH20  2) Discussed acclimating to machine and change mask within first 30 days if required. Bring chip download to each appointment.  3) Light conditioning encouraged  4) Continue ** Encourage  smoking cessation   4) Vaccines: Up to date with **  5) Return in about 2 months (around 3/20/2020) for Compliance.

## 2020-01-20 NOTE — PROGRESS NOTES
CC:  Here for f/u sleep issues as listed below    HPI:   Toro presents today for follow up obstructive sleep apnea with sleep study results.  PMH of chronic pain.     PSG from 12/2019 indicated an AHI of 16.3 that increases to 75.8 in REM, and low oxygenation of 79%.  CPAP was applied at 5-9 cm water pressure with persistence of hypopnea events.  Very rare treatment onset central apnea episodes were noted.  BiPAP was applied at 12-13/8-9 cm water pressure. He did best on BIPAP 13/9 with an AHI of 1.3, mean oxygenation of 95%, REm rebound.     Reviewed results and treatment options including CPAP treatment versus in lab titration, dental appliance, UPPP surgery, and behavioral modifications.  Patient is amendable to BIPAP. They understand they may need a future sleep study if treatment is ineffective.   Patient is currently sleeping 7-9 hours per night with 0 nighttime awakenings. They have no trouble falling asleep.   They do feel refreshed in the morning and denies morning H/A. They feel tired throughout the day and denies naps.  Patient reports snoring. Denies apnea events and paroxysmal nocturnal dyspnea events. They have never fallen asleep in conversation, at the wheel, or at work.  They deny sleepwalking. Deny symptoms of restless leg. BMI is 33.         Patient Active Problem List    Diagnosis Date Noted   • MICHAEL (obstructive sleep apnea) 11/22/2019   • Former smoker 11/22/2019   • Up to date with influenza vaccination 11/22/2019   • Chronic left-sided back pain 08/12/2019   • Snoring 08/12/2019   • Non-seasonal allergic rhinitis 08/12/2019   • Obesity (BMI 30-39.9) 08/12/2019       Past Medical History:   Diagnosis Date   • Allergy    • Asthma    • Bronchitis        History reviewed. No pertinent surgical history.    Family History   Problem Relation Age of Onset   • No Known Problems Mother    • Diabetes Father    • Heart Disease Father    • No Known Problems Brother    • No Known Problems Brother         Social History     Socioeconomic History   • Marital status:      Spouse name: Not on file   • Number of children: Not on file   • Years of education: Not on file   • Highest education level: Not on file   Occupational History   • Not on file   Social Needs   • Financial resource strain: Not on file   • Food insecurity:     Worry: Not on file     Inability: Not on file   • Transportation needs:     Medical: Not on file     Non-medical: Not on file   Tobacco Use   • Smoking status: Former Smoker     Packs/day: 1.00     Years: 30.00     Pack years: 30.00     Types: Cigarettes     Last attempt to quit:      Years since quittin.0   • Smokeless tobacco: Never Used   Substance and Sexual Activity   • Alcohol use: No   • Drug use: No   • Sexual activity: Yes   Lifestyle   • Physical activity:     Days per week: Not on file     Minutes per session: Not on file   • Stress: Not on file   Relationships   • Social connections:     Talks on phone: Not on file     Gets together: Not on file     Attends Rastafarian service: Not on file     Active member of club or organization: Not on file     Attends meetings of clubs or organizations: Not on file     Relationship status: Not on file   • Intimate partner violence:     Fear of current or ex partner: Not on file     Emotionally abused: Not on file     Physically abused: Not on file     Forced sexual activity: Not on file   Other Topics Concern   • Not on file   Social History Narrative   • Not on file       Current Outpatient Medications   Medication Sig Dispense Refill   • zolpidem (AMBIEN) 5 MG Tab Take 1 Tab by mouth at bedtime as needed for Sleep (1 to 2 po qhs prn insomnia/sleep study. Bring to sleep study.) for up to 2 doses. (Patient not taking: Reported on 2020) 2 Tab 0     No current facility-administered medications for this visit.           Allergies: Pcn [penicillins]      ROS   Gen: Denies fever, chills, unintentional weight loss,  fatigue  Resp:Denies Dyspnea  CV: Denies chest pain, chest tightness  Sleep:Denies morning headache, insomnia,  gasping for air, apnea  Neuro: Denies frequent headaches, weakness, dizziness  See HPI.  All other systems reviewed and negative        Vital signs for this encounter:  Vitals:    01/20/20 1415   Height: 1.829 m (6')   Weight: 112.9 kg (249 lb)   Weight % change since last entry.: 0 %   BP: 128/62   Pulse: 84   BMI (Calculated): 33.77                   Physical Exam:   Gen:         Alert and oriented, No apparent distress.   Neck:        No Lymphadenopathy.  Lungs:     Clear to auscultation bilaterally.    CV:          Regular rate and rhythm. No murmurs, rubs or gallops.   Abd:         Soft non tender, non distended.            Ext:          No clubbing, cyanosis, edema.    Assessment   1. MICHAEL (obstructive sleep apnea)  DME BiPAP   2. Obesity (BMI 30-39.9)  OBESITY COUNSELING (No Charge): Patient identified as having weight management issue.  Appropriate orders and counseling given.       Patient is clinically stable and will proceed with following plan.     PLAN:   Patient Instructions   1) Start BIPAP at 13/9cmH20  2) Discussed acclimating to machine and change mask within first 30 days if required. Bring chip download to each appointment.  3) Light conditioning encouraged  4) Continue smoking cessation   4) Vaccines: Up to date with flu  5) Return in about 2 months (around 3/20/2020) for Compliance

## 2020-03-09 ENCOUNTER — OFFICE VISIT (OUTPATIENT)
Dept: MEDICAL GROUP | Facility: LAB | Age: 50
End: 2020-03-09
Payer: COMMERCIAL

## 2020-03-09 VITALS
WEIGHT: 251 LBS | BODY MASS INDEX: 34 KG/M2 | RESPIRATION RATE: 14 BRPM | DIASTOLIC BLOOD PRESSURE: 74 MMHG | OXYGEN SATURATION: 96 % | HEART RATE: 68 BPM | HEIGHT: 72 IN | SYSTOLIC BLOOD PRESSURE: 112 MMHG | TEMPERATURE: 97.8 F

## 2020-03-09 DIAGNOSIS — G47.33 OSA (OBSTRUCTIVE SLEEP APNEA): ICD-10-CM

## 2020-03-09 DIAGNOSIS — E78.2 MIXED DYSLIPIDEMIA: ICD-10-CM

## 2020-03-09 DIAGNOSIS — J30.89 NON-SEASONAL ALLERGIC RHINITIS, UNSPECIFIED TRIGGER: ICD-10-CM

## 2020-03-09 DIAGNOSIS — Z00.00 PREVENTATIVE HEALTH CARE: ICD-10-CM

## 2020-03-09 PROBLEM — R06.83 SNORING: Status: RESOLVED | Noted: 2019-08-12 | Resolved: 2020-03-09

## 2020-03-09 PROCEDURE — 99396 PREV VISIT EST AGE 40-64: CPT | Performed by: FAMILY MEDICINE

## 2020-03-09 ASSESSMENT — ENCOUNTER SYMPTOMS
BLURRED VISION: 0
DOUBLE VISION: 0
ABDOMINAL PAIN: 0
FEVER: 0
DIARRHEA: 0
CONSTIPATION: 0
VOMITING: 0
NAUSEA: 0
CHILLS: 0
SHORTNESS OF BREATH: 0
PALPITATIONS: 0
WHEEZING: 0

## 2020-03-09 ASSESSMENT — FIBROSIS 4 INDEX: FIB4 SCORE: 0.62

## 2020-03-09 ASSESSMENT — PATIENT HEALTH QUESTIONNAIRE - PHQ9: CLINICAL INTERPRETATION OF PHQ2 SCORE: 0

## 2020-03-09 NOTE — PROGRESS NOTES
Toro Graves is a 50 y.o. male here for   Chief Complaint   Patient presents with   • Establish Care     Kb pt, needing new PCP        HPI:  Toro is a very pleasant 50 y.o. male.     #MICHAEL  -Patient was diagnosed with sleep apnea approximately month ago, currently using CPAP.  States using CPAP every night.  Has not noticed had an acute difference in fatigue or sleep; however, does state that his wife has noted that he does not snore when using the CPAP.  -No other questions or concerns regarding MICHAEL at this time.     #Seasonal Allergies   -Patient states that he has a significant history of seasonal allergies.  He states that since moving to [place] about 15 years ago he continues to have allergies and allergy symptoms which include dry, scratchy eyes, runny nose, sneezing.  -Has previously used Flonase in the past medical however cough is giving him migraines.  -Continues to use antihistamines as needed for symptoms.     #Dislipidemia   -Diagnosed after a positive lipid panel and 8/3/2019 with an elevated LDL and triglycerides.  -Patient is currently working on increasing walking, increasing vegetables in his diet.  -No questions or concerns at this time.     #Preventative health:  -Discussed current health, cancer screenings: None needed.  Cologtaniya completed last year.  -Lipid profile completed on 8/3/2019.  Hemoglobin A1c completed 8/13/2018, 5.4%.  -Vaccinations needed: Zoster.  -Working on appropriate diet, walking a lot at work however no dedicated aerobic activity.#MICHAEL  -Patient was diagnosed with sleep apnea approximately month ago, currently using CPAP.  States using CPAP every night.  Has not noticed had an acute difference in fatigue or sleep; however, does state that his wife has noted that he does not snore when using the CPAP.  -No other questions or concerns regarding MICHAEL at this time.     #Seasonal Allergies   -Patient states that he has a significant history of seasonal allergies.   He states that since moving to [place] about 15 years ago he continues to have allergies and allergy symptoms which include dry, scratchy eyes, runny nose, sneezing.  -Has previously used Flonase in the past medical however cough is giving him migraines.  -Continues to use antihistamines as needed for symptoms.     #Dislipidemia   -Diagnosed after a positive lipid panel and 8/3/2019 with an elevated LDL and triglycerides.  -Patient is currently working on increasing walking, increasing vegetables in his diet.  -No questions or concerns at this time.     #Preventative health:  -Discussed current health, cancer screenings: None needed.  Cologuard completed last year.  -Lipid profile completed on 8/3/2019.  Hemoglobin A1c completed 2018, 5.4%.  -Vaccinations needed: Zoster.  -Working on appropriate diet, walking a lot at work however no dedicated aerobic activity.      Current medicines (including changes today)  Current Outpatient Medications   Medication Sig Dispense Refill   • zolpidem (AMBIEN) 5 MG Tab Take 1 Tab by mouth at bedtime as needed for Sleep (1 to 2 po qhs prn insomnia/sleep study. Bring to sleep study.) for up to 2 doses. (Patient not taking: Reported on 2020) 2 Tab 0     No current facility-administered medications for this visit.      He  has a past medical history of Allergy, Asthma, and Bronchitis.  He  has no past surgical history on file.  Social History     Tobacco Use   • Smoking status: Former Smoker     Packs/day: 1.00     Years: 30.00     Pack years: 30.00     Types: Cigarettes     Last attempt to quit: 2009     Years since quittin.1   • Smokeless tobacco: Never Used   Substance Use Topics   • Alcohol use: No   • Drug use: No     Social History     Social History Narrative   • Not on file     Family History   Problem Relation Age of Onset   • No Known Problems Mother    • Diabetes Father    • Heart Disease Father    • No Known Problems Brother    • No Known Problems Brother   "    Family Status   Relation Name Status   • Mo  Alive   • Fa     • Bro  Alive   • Bro  (Not Specified)         ROS  Review of Systems   Constitutional: Negative for chills and fever.   HENT: Negative for hearing loss.    Eyes: Negative for blurred vision and double vision.   Respiratory: Negative for shortness of breath and wheezing.    Cardiovascular: Negative for chest pain and palpitations.   Gastrointestinal: Negative for abdominal pain, constipation, diarrhea, nausea and vomiting.   Skin: Negative for rash.   All other systems reviewed and are negative.       Objective:     /74 (BP Location: Right arm, Patient Position: Sitting, BP Cuff Size: Adult)   Pulse 68   Temp 36.6 °C (97.8 °F) (Temporal)   Resp 14   Ht 1.829 m (6' 0.01\")   Wt 113.9 kg (251 lb)   SpO2 96%  Body mass index is 34.03 kg/m².  Physical Exam:    Constitutional: Alert, no distress.  Skin: Warm, dry, good turgor, no rashes in visible areas.  Eye: Equal, round and reactive, conjunctiva clear, lids normal.  ENMT: Lips without lesions, good dentition, oropharynx clear. TM's pearly gray with normal light reflexes bilaterally  Neck: Trachea midline, no masses, no thyromegaly. No cervical or supraclavicular lymphadenopathy.  Respiratory: Unlabored respiratory effort, lungs clear to auscultation bilaterally, no wheezes, rales, or ronchi.  Cardiovascular: Normal S1, S2, RRR, no murmur, no edema.  Abdomen: Soft, non-tender, no masses, no hepatosplenomegaly.  Psych: Alert and oriented x3, normal affect and mood.      Assessment and Plan:   The following treatment plan was discussed  1. Preventative health care  -All questions concerns were answered at this time.  -No routine screenings at this time.  -Vaccinations needed: Zoster.  Patient will follow-up at pharmacy.  -Last labs completed in 2019, no labs needed at this time.  -Continue with proper diet, exercise P  -Follow-up in 1 year for annual visit.    2. Non-seasonal allergic " rhinitis, unspecified trigger  -Status: Stable.  Patient will continue with over-the-counter medication at this point.  He is interested in possible allergy Kenalog shots at a future date.  We will follow-up when needed.    3. MICHAEL (obstructive sleep apnea)  -Status: Stable.  Continue use of CPAP.  -Follow-up with sleep medicine doctor as needed.    4. Mixed dyslipidemia  -The 10-year ASCVD risk score (Emmy FAM Jr., et al., 2013) is: 4.1%  -At this time will work on appropriate diet, exercise.  -We will recheck lipids in September.      Records requested.  Followup: Return in about 1 year (around 3/9/2021), or if symptoms worsen or fail to improve.         This note was created using voice recognition software. I have made every reasonable attempt to correct errors, however, I do anticipate some grammatical errors.

## 2020-03-28 NOTE — PROGRESS NOTES
CC: Clinical and compliance review    HPI:  Mr. Toro Graves is a 50-year-old  kindly referred by CHERELLE Sexton for evaluation of obstructive sleep apnea syndrome and seen as a new patient on 11/25/2019.  His sleep study performed 1/20/2020 showed an AHI of 16.3, a REM AHI of 75.8, and a greg saturation of 79%.  BiPAP was initiated at 13/9 cm water.    The patient reports improved symptoms using positive airway pressure.  Has experienced no significant problems with mask fit, mask leak, pressure tolerance, excessive airway dryness, nasal congestion, aerophagia, or chest pain.    Today, 3/30/2020, his 30-day compliance is 30% for 5 hours and 12 minutes.  He has an average residual estimated apnea hypopnea index of 1.8.  His median leak is 7.8 L/min his maximum leak is 34.1 L/min. Wife is already in bed when he gets home which keeps him from using it as he doesn't want to wake her up as she gets up early.      Significant comorbidities and modifying factors include back pain,  obesity, bronchitis, nonseasonal allergic rhinitis, snoring, former smoker and up-to-date with influenza vaccination.    Patient Active Problem List    Diagnosis Date Noted   • Mixed dyslipidemia 03/09/2020   • MICHAEL (obstructive sleep apnea) 11/22/2019   • Former smoker 11/22/2019   • Up to date with influenza vaccination 11/22/2019   • Chronic left-sided back pain 08/12/2019   • Non-seasonal allergic rhinitis 08/12/2019   • Obesity (BMI 30-39.9) 08/12/2019       Past Medical History:   Diagnosis Date   • Allergy    • Asthma    • Bronchitis         History reviewed. No pertinent surgical history.    Family History   Problem Relation Age of Onset   • No Known Problems Mother    • Diabetes Father    • Heart Disease Father    • No Known Problems Brother    • No Known Problems Brother        Social History     Socioeconomic History   • Marital status:      Spouse name: Not on file   • Number of children: Not on file  "  • Years of education: Not on file   • Highest education level: Not on file   Occupational History   • Not on file   Social Needs   • Financial resource strain: Not on file   • Food insecurity     Worry: Not on file     Inability: Not on file   • Transportation needs     Medical: Not on file     Non-medical: Not on file   Tobacco Use   • Smoking status: Former Smoker     Packs/day: 1.00     Years: 30.00     Pack years: 30.00     Types: Cigarettes     Last attempt to quit:      Years since quittin.2   • Smokeless tobacco: Never Used   Substance and Sexual Activity   • Alcohol use: No   • Drug use: No   • Sexual activity: Yes   Lifestyle   • Physical activity     Days per week: Not on file     Minutes per session: Not on file   • Stress: Not on file   Relationships   • Social connections     Talks on phone: Not on file     Gets together: Not on file     Attends Religion service: Not on file     Active member of club or organization: Not on file     Attends meetings of clubs or organizations: Not on file     Relationship status: Not on file   • Intimate partner violence     Fear of current or ex partner: Not on file     Emotionally abused: Not on file     Physically abused: Not on file     Forced sexual activity: Not on file   Other Topics Concern   • Not on file   Social History Narrative   • Not on file       No current outpatient medications on file.     No current facility-administered medications for this visit.     \"CURRENT RX\"    ALLERGIES: Pcn [penicillins]    ROS  Eyes: Wears glasses  Constitutional: Denies fever, chills, sweats,  weight loss, fatigue  Cardiovascular: Denies chest pain, tightness, palpitations, swelling in legs/feet, fainting, difficulty breathing when lying down but gets better when sitting up.   Respiratory: Denies shortness of breath, cough, sputum, wheezing, painful breathing, coughing up blood.   Sleep: per HPI  Gastrointestinal: Denies  difficulty swallowing, nausea, abdominal " pain, diarrhea, constipation, heartburn.  Musculoskeletal: Denies painful joints, sore muscles, back pain.          Melanocytic review venous  PHYSICAL EXAM  Obese    /72 (BP Location: Right arm, Patient Position: Sitting, BP Cuff Size: Adult)   Pulse 74   Resp 14   Ht 1.829 m (6')   Wt 113.9 kg (251 lb)   SpO2 97%   BMI 34.04 kg/m²   Appearance: Well-nourished, well-developed, no acute distress  Eyes:  PERRLA, EOMI  ENMT: without lesions, deformities;hearing grossly intact; tongue normal, posterior pharynx without erythema or exudate;   Modified Mallampati (AKA Freidman) Score: 2-3  Neck: Supple, trachea midline, no masses  Respiratory effort:  No intercostal retractions or use of accessory muscles  Lung auscultation:  No wheezes rhonchi rubs or rales  Cardiac: No murmurs, rubs, or gallops; regular rhythm, normal rate; no edema  Abdomen:  No tenderness, no organomegaly.  Obese  Musculoskeletal:  Grossly normal; gait and station normal; digits and nails normal  Skin:  No rashes, petechiae, cyanosis  Neurologic: without focal signs; oriented to person, time, place, and purpose; judgement intact  Psychiatric:  No depression, anxiety, agitation        PROBLEMS:    1. MICHAEL (obstructive sleep apnea)      2. Up to date with influenza vaccination      3. Obesity (BMI 30-39.9)    - OBESITY COUNSELING (No Charge): Patient identified as having weight management issue.  Appropriate orders and counseling given.    4. Non-seasonal allergic rhinitis, unspecified trigger      5. Mixed dyslipidemia      6. Former smoker        PLAN:   He was encouraged to use his device each and every night for at least 4 hours.    Has been advised to continue the current bilevel 13/9 cm water, clean equipment frequently, and get new mask and supplies as allowed by insurance and DME. Advised about potential problems including nasal obstruction/stuffiness, mask leak or discomfort , frequent awakenings, chill or dryness of the upper  airway, noise, inconvenience, and lack of benefit. Recommend an earlier appointment, if significant treatment barriers develop.    Have advised the patient to follow up with the appropriate healthcare practitioners for all other medical problems and issues.    Return in about 6 months (around 9/30/2020).

## 2020-03-30 ENCOUNTER — SLEEP CENTER VISIT (OUTPATIENT)
Dept: SLEEP MEDICINE | Facility: MEDICAL CENTER | Age: 50
End: 2020-03-30
Payer: COMMERCIAL

## 2020-03-30 VITALS
SYSTOLIC BLOOD PRESSURE: 128 MMHG | DIASTOLIC BLOOD PRESSURE: 72 MMHG | OXYGEN SATURATION: 97 % | BODY MASS INDEX: 34 KG/M2 | HEIGHT: 72 IN | RESPIRATION RATE: 14 BRPM | WEIGHT: 251 LBS | HEART RATE: 74 BPM

## 2020-03-30 DIAGNOSIS — G47.33 OSA (OBSTRUCTIVE SLEEP APNEA): ICD-10-CM

## 2020-03-30 DIAGNOSIS — Z92.29 UP TO DATE WITH INFLUENZA VACCINATION: ICD-10-CM

## 2020-03-30 DIAGNOSIS — Z87.891 FORMER SMOKER: ICD-10-CM

## 2020-03-30 DIAGNOSIS — E66.9 OBESITY (BMI 30-39.9): ICD-10-CM

## 2020-03-30 DIAGNOSIS — J30.89 NON-SEASONAL ALLERGIC RHINITIS, UNSPECIFIED TRIGGER: ICD-10-CM

## 2020-03-30 DIAGNOSIS — E78.2 MIXED DYSLIPIDEMIA: ICD-10-CM

## 2020-03-30 PROCEDURE — 99214 OFFICE O/P EST MOD 30 MIN: CPT | Performed by: INTERNAL MEDICINE

## 2020-03-30 ASSESSMENT — FIBROSIS 4 INDEX: FIB4 SCORE: 0.62

## 2020-08-31 ENCOUNTER — OFFICE VISIT (OUTPATIENT)
Dept: MEDICAL GROUP | Facility: LAB | Age: 50
End: 2020-08-31
Payer: COMMERCIAL

## 2020-08-31 ENCOUNTER — HOSPITAL ENCOUNTER (OUTPATIENT)
Dept: RADIOLOGY | Facility: MEDICAL CENTER | Age: 50
End: 2020-08-31
Attending: FAMILY MEDICINE
Payer: COMMERCIAL

## 2020-08-31 VITALS
HEIGHT: 72 IN | TEMPERATURE: 97.9 F | WEIGHT: 246 LBS | RESPIRATION RATE: 12 BRPM | OXYGEN SATURATION: 97 % | HEART RATE: 76 BPM | BODY MASS INDEX: 33.32 KG/M2 | SYSTOLIC BLOOD PRESSURE: 116 MMHG | DIASTOLIC BLOOD PRESSURE: 70 MMHG

## 2020-08-31 DIAGNOSIS — M79.672 LEFT FOOT PAIN: ICD-10-CM

## 2020-08-31 PROCEDURE — 99214 OFFICE O/P EST MOD 30 MIN: CPT | Performed by: FAMILY MEDICINE

## 2020-08-31 PROCEDURE — 73630 X-RAY EXAM OF FOOT: CPT | Mod: LT

## 2020-08-31 ASSESSMENT — ENCOUNTER SYMPTOMS
TINGLING: 0
SHORTNESS OF BREATH: 0
FEVER: 0
WHEEZING: 0
SENSORY CHANGE: 0
WEIGHT LOSS: 0
CHILLS: 0
PALPITATIONS: 0

## 2020-08-31 ASSESSMENT — FIBROSIS 4 INDEX: FIB4 SCORE: 0.62

## 2020-08-31 NOTE — PROGRESS NOTES
"Subjective:   Toro Graves is a 50 y.o. male here today for   Chief Complaint   Patient presents with   • Foot Pain     Heel of left foot in pain, x 2 months now.        #Left foot pain:  -Patient states her last 2 months has been experiencing pain in the bottom of his left foot.  -States the pain is located on the bottom/very back of his foot.  Pain is nonradiating.  It is worse when stepping down out of his car, worse when stepping out of bed in the morning.  He states after walking for some time it does improve.  Denies any recent injury, no previous medical history of problems with left foot.  He denies any swelling, redness, heat to touch.  -No other palliative/provocative factors noted.      Allergies   Allergen Reactions   • Pcn [Penicillins] Anaphylaxis     Per patient         Current medicines (including changes today)  No current outpatient medications on file.     No current facility-administered medications for this visit.      He  has a past medical history of Allergy, Asthma, and Bronchitis.    ROS   Review of Systems   Constitutional: Negative for chills, fever and weight loss.   Respiratory: Negative for shortness of breath and wheezing.    Cardiovascular: Negative for chest pain and palpitations.   Musculoskeletal: Negative for joint pain.   Neurological: Negative for tingling and sensory change.          Objective:     Physical Exam:  /70   Pulse 76   Temp 36.6 °C (97.9 °F) (Temporal)   Resp 12   Ht 1.829 m (6' 0.01\")   Wt 111.6 kg (246 lb)   SpO2 97%  Body mass index is 33.36 kg/m².   Const: Vitals above. Well-appearing.  CV: Inspected/palpated for lower extremity edema. Bilateral dorsalis pedis/posterior tibial pulses palpated, noting below if not 2+. Capillary refill evaluated, noting below if greater than 2 seconds.  Skin: Inspected for rash or skin lesions in area of concern.  Neuro/psych: Sensation to light touch evaluated at web space of great toe (peroneal nerve), " medial leg/foot (L4), lateral leg/dorsal foot (L5), lateral foot (S1). Observed for normal mood/affect/insight.  MSK: Evaluated gait, posture, weightbearing status. Examination of bilateral ankles:  - Inspected/palpated bilaterally for warmth, erythema/discoloration, effusion/swelling, deformity, and tenderness of the proximal fibula, malleolae, navicular and tarsal bones, talus (dome, lateral/posterior processes), calcaneus, 5th metatarsal and all other metatarsals, phalanges, peroneus brevis/longus tendons, Achilles tendon, anterior tibiofibular ligament, deltoid ligament, ATFL/CFL/PTFL, and Lisfranc ligament. Syndesmosis squeeze and Brand test were performed.  - Assessed passive/active range of motion bilaterally in dorsiflexion, plantar flexion, inversion, and eversion, noting below for pain or crepitation.  - Assessed muscle strength bilaterally in dorsiflexion, plantar flexion, inversion, and eversion, noting below if less than 5/5 or pain. Observed for muscle atrophy.  - Assessed stability bilaterally at ATFL (anterior drawer), CFL (talar tilt).    All of the above were found to be normal, except:   Tenderness palpation across the area of the PT FL.  Anterior drawer slightly positive.      Assessment and Plan:     1. Left foot pain  -Uncertain etiology of foot pain at this time.  Patient symptoms he describes today are consistent with also plantar fasciitis; however, physical examination was by with the exception of positive anterior drawer test which could point to an ATFL injury.  At this time we will begin treatment as if it is plantar fasciitis, will get foot x-rays, home physical therapy.  -Patient will continue use exercises for 2 to 3 weeks, will call if no improvement.  -We will call patient with results of foot x-ray.  - DX-FOOT-COMPLETE 3+ LEFT; Future    Followup: No follow-ups on file.         PLEASE NOTE: This dictation was created using voice recognition software. I have made every reasonable  attempt to correct obvious errors, but I expect that there are errors of grammar and possibly content that I did not discover before finalizing the note.

## 2020-09-02 ENCOUNTER — PATIENT MESSAGE (OUTPATIENT)
Dept: MEDICAL GROUP | Facility: LAB | Age: 50
End: 2020-09-02

## 2020-09-02 DIAGNOSIS — M77.30 BONE SPUR OF INFERIOR PORTION OF CALCANEUS, UNSPECIFIED LATERALITY: ICD-10-CM

## 2020-09-02 DIAGNOSIS — M72.2 PLANTAR FASCIITIS: ICD-10-CM

## 2020-09-26 NOTE — PROGRESS NOTES
CC: Follow-up for MICHAEL on BiPAP 13/9 cm water    HPI:  Toro Graves is a 50 y.o.male  employed by the [employer] kindly referred by CHERELLE Sexton and last seen 3/30/2020 for MICHAEL on BiPAP.  When last seen his compliance was excellent and his AHI was normalized. His sleep study performed 1/20/2020 showed an AHI of 16.3, a REM AHI of 75.8, and a greg saturation of 79%. BiPAP was initiated at 13/9 cm water.      Today, 9/28/2020, his 30-day compliance is 57% for 6 hours and 36 minutes.  He has an average residual apnea hypopnea index of 1.7.  His wife is already in bed when he gets home from work.  His wife gets up early herself to go to work.  On these days he is reluctant to use his BiPAP unit as he does not want to disturb his wife's sleep. Also, wears a mask all day at work and has trouble using another at night.    The patient reports improved symptoms using positive airway pressure.  Has experienced no significant problems with mask fit, mask leak, pressure tolerance, excessive airway dryness, nasal congestion, aerophagia, or chest pain.        Significant comorbidities and modifying factors include back pain, obesity, bronchitis, nonseasonal allergic rhinitis, snoring, and former smoker.        Patient Active Problem List    Diagnosis Date Noted   • Mixed dyslipidemia 03/09/2020   • MICHAEL (obstructive sleep apnea) 11/22/2019   • Former smoker 11/22/2019   • Up to date with influenza vaccination 11/22/2019   • Chronic left-sided back pain 08/12/2019   • Non-seasonal allergic rhinitis 08/12/2019   • Obesity (BMI 30-39.9) 08/12/2019       Past Medical History:   Diagnosis Date   • Allergy    • Asthma    • Bronchitis         History reviewed. No pertinent surgical history.    Family History   Problem Relation Age of Onset   • No Known Problems Mother    • Diabetes Father    • Heart Disease Father    • No Known Problems Brother    • No Known Problems Brother        Social History  "    Socioeconomic History   • Marital status:      Spouse name: Not on file   • Number of children: Not on file   • Years of education: Not on file   • Highest education level: Not on file   Occupational History   • Not on file   Social Needs   • Financial resource strain: Not on file   • Food insecurity     Worry: Not on file     Inability: Not on file   • Transportation needs     Medical: Not on file     Non-medical: Not on file   Tobacco Use   • Smoking status: Former Smoker     Packs/day: 1.00     Years: 30.00     Pack years: 30.00     Types: Cigarettes     Quit date:      Years since quittin.7   • Smokeless tobacco: Never Used   Substance and Sexual Activity   • Alcohol use: No   • Drug use: No   • Sexual activity: Yes   Lifestyle   • Physical activity     Days per week: Not on file     Minutes per session: Not on file   • Stress: Not on file   Relationships   • Social connections     Talks on phone: Not on file     Gets together: Not on file     Attends Oriental orthodox service: Not on file     Active member of club or organization: Not on file     Attends meetings of clubs or organizations: Not on file     Relationship status: Not on file   • Intimate partner violence     Fear of current or ex partner: Not on file     Emotionally abused: Not on file     Physically abused: Not on file     Forced sexual activity: Not on file   Other Topics Concern   • Not on file   Social History Narrative   • Not on file       No current outpatient medications on file.     No current facility-administered medications for this visit.     \"CURRENT RX\"    ALLERGIES: Pcn [penicillins]    ROS    Constitutional: Denies fever, chills, sweats,  weight loss, fatigue  Cardiovascular: Denies chest pain, tightness, palpitations, swelling in legs/feet, fainting, difficulty breathing when lying down but gets better when sitting up.   Respiratory: Denies shortness of breath, cough, sputum, wheezing, painful breathing, coughing up " "blood.   Sleep: per HPI  Gastrointestinal: Denies  difficulty swallowing, nausea, abdominal pain, diarrhea, constipation, heartburn.  Musculoskeletal: Denies painful joints, sore muscles, back pain.  Left foot bone spur pushing on achilles tendon, may need surgery.      PHYSICAL EXAM  Obese    /80 (BP Location: Right arm, Patient Position: Sitting, BP Cuff Size: Adult)   Pulse 60   Resp 16   Ht 1.854 m (6' 1\")   Wt 111.6 kg (246 lb)   SpO2 98%   BMI 32.46 kg/m²   Appearance: Well-nourished, well-developed, no acute distress  Eyes:  PERRLA, EOMI; glasses  ENMT: Mask on  Neck: Supple, trachea midline, no masses  Respiratory effort:  No intercostal retractions or use of accessory muscles  Lung auscultation:  No wheezes rhonchi rubs or rales  Cardiac: No murmurs, rubs, or gallops; regular rhythm, normal rate; no edema  Abdomen:  No tenderness, no organomegaly.  Obese  Musculoskeletal:  Grossly normal; gait and station normal; digits and nails normal  Skin:  No rashes, petechiae, cyanosis  Neurologic: without focal signs; oriented to person, time, place, and purpose; judgement intact  Psychiatric:  No depression, anxiety, agitation        PROBLEMS:    1. MICHAEL (obstructive sleep apnea)    2. Obesity (BMI 30-39.9)    3. Mixed dyslipidemia    4. Former smoker      PLAN:     Has been advised to continue the current BiPAP 13/9 cm water, clean equipment frequently, and get new mask and supplies as allowed by insurance and DME. Advised about potential problems including nasal obstruction/stuffiness, mask leak or discomfort , frequent awakenings, chill or dryness of the upper airway, noise, inconvenience, and lack of benefit. Recommend an earlier appointment, if significant treatment barriers develop.    Have advised the patient to follow up with the appropriate healthcare practitioners for all other medical problems and issues.    He was encouraged to use his BiPAP for as many days as possible for as long as " possible.    Return in about 1 year (around 9/28/2021).

## 2020-09-28 ENCOUNTER — SLEEP CENTER VISIT (OUTPATIENT)
Dept: SLEEP MEDICINE | Facility: MEDICAL CENTER | Age: 50
End: 2020-09-28
Payer: COMMERCIAL

## 2020-09-28 VITALS
WEIGHT: 246 LBS | HEART RATE: 60 BPM | DIASTOLIC BLOOD PRESSURE: 80 MMHG | OXYGEN SATURATION: 98 % | SYSTOLIC BLOOD PRESSURE: 120 MMHG | RESPIRATION RATE: 16 BRPM | BODY MASS INDEX: 32.6 KG/M2 | HEIGHT: 73 IN

## 2020-09-28 DIAGNOSIS — Z87.891 FORMER SMOKER: ICD-10-CM

## 2020-09-28 DIAGNOSIS — E78.2 MIXED DYSLIPIDEMIA: ICD-10-CM

## 2020-09-28 DIAGNOSIS — G47.33 OSA (OBSTRUCTIVE SLEEP APNEA): ICD-10-CM

## 2020-09-28 DIAGNOSIS — E66.9 OBESITY (BMI 30-39.9): ICD-10-CM

## 2020-09-28 PROCEDURE — 99214 OFFICE O/P EST MOD 30 MIN: CPT | Performed by: INTERNAL MEDICINE

## 2020-09-28 ASSESSMENT — FIBROSIS 4 INDEX: FIB4 SCORE: 0.62

## 2021-03-12 ENCOUNTER — TELEPHONE (OUTPATIENT)
Dept: MEDICAL GROUP | Facility: LAB | Age: 51
End: 2021-03-12

## 2021-03-12 NOTE — TELEPHONE ENCOUNTER
ANNUAL WELLNESS VISIT PRE-VISIT PLANNING    1.  Reviewed notes from the last office visit: Yes    2.  If any orders were ordered or intended to be done prior to visit (i.e. 6 mos follow-up), do we have results/consult notes or has patient scheduled?        •  Labs - Labs were not ordered at last office visit.  Note: If patient appointment is for lab review and patient did not complete labs, check with provider if OK to reschedule patient until labs completed.       •  Imaging - Imaging ordered, completed and results are in chart.       •  Referrals - No referrals were ordered at last office visit.    3.  Immunizations were updated in Epic using Reconcile Outside Information activity? Yes         4.  Patient is due for the following Health Maintenance Topics:   Health Maintenance Due   Topic Date Due   • IMM ZOSTER VACCINES (1 of 2) Never done         5.  Reviewed/Updated the following with patient:       •   Preferred Pharmacy? Yes        •   Preferred Lab? Yes        •   Preferred Communication? Yes        •   Allergies? Yes        •   Medications? Yes         6.  Care Team Updated:       •   DME Company (gait device, O2, CPAP, etc.): Yes        •   Other Specialists (eye doctor, derm, GYN, cardiology, endo, etc): Yes   7.  Patient was advised: “This is a free wellness visit. The provider will screen for medical conditions to help you stay healthy. If you have other concerns to address you may be asked to discuss these at a separate visit or there may be an additional fee.”     8.  AHA (Puls8) form printed for Provider? N/A

## 2021-03-12 NOTE — TELEPHONE ENCOUNTER
Left message for patient to call back regarding pre-visit planning. Please transfer call to [phone]

## 2021-03-15 ENCOUNTER — OFFICE VISIT (OUTPATIENT)
Dept: MEDICAL GROUP | Facility: LAB | Age: 51
End: 2021-03-15
Payer: COMMERCIAL

## 2021-03-15 VITALS
TEMPERATURE: 97.8 F | HEART RATE: 75 BPM | SYSTOLIC BLOOD PRESSURE: 112 MMHG | BODY MASS INDEX: 33.18 KG/M2 | RESPIRATION RATE: 13 BRPM | OXYGEN SATURATION: 97 % | WEIGHT: 245 LBS | HEIGHT: 72 IN | DIASTOLIC BLOOD PRESSURE: 74 MMHG

## 2021-03-15 DIAGNOSIS — J30.89 NON-SEASONAL ALLERGIC RHINITIS, UNSPECIFIED TRIGGER: ICD-10-CM

## 2021-03-15 DIAGNOSIS — Z00.00 ANNUAL PHYSICAL EXAM: ICD-10-CM

## 2021-03-15 DIAGNOSIS — Z23 NEED FOR VACCINATION: ICD-10-CM

## 2021-03-15 DIAGNOSIS — E78.2 MIXED DYSLIPIDEMIA: ICD-10-CM

## 2021-03-15 PROBLEM — Z92.29 UP TO DATE WITH INFLUENZA VACCINATION: Status: RESOLVED | Noted: 2019-11-22 | Resolved: 2021-03-15

## 2021-03-15 PROCEDURE — 90471 IMMUNIZATION ADMIN: CPT | Performed by: FAMILY MEDICINE

## 2021-03-15 PROCEDURE — 90750 HZV VACC RECOMBINANT IM: CPT | Performed by: FAMILY MEDICINE

## 2021-03-15 PROCEDURE — 99396 PREV VISIT EST AGE 40-64: CPT | Mod: 25 | Performed by: FAMILY MEDICINE

## 2021-03-15 SDOH — ECONOMIC STABILITY: INCOME INSECURITY: IN THE LAST 12 MONTHS, WAS THERE A TIME WHEN YOU WERE NOT ABLE TO PAY THE MORTGAGE OR RENT ON TIME?: NO

## 2021-03-15 SDOH — ECONOMIC STABILITY: HOUSING INSECURITY

## 2021-03-15 SDOH — HEALTH STABILITY: PHYSICAL HEALTH

## 2021-03-15 SDOH — ECONOMIC STABILITY: TRANSPORTATION INSECURITY

## 2021-03-15 SDOH — HEALTH STABILITY: MENTAL HEALTH

## 2021-03-15 SDOH — ECONOMIC STABILITY: HOUSING INSECURITY
IN THE LAST 12 MONTHS, WAS THERE A TIME WHEN YOU DID NOT HAVE A STEADY PLACE TO SLEEP OR SLEPT IN A SHELTER (INCLUDING NOW)?: NO

## 2021-03-15 ASSESSMENT — ENCOUNTER SYMPTOMS
SHORTNESS OF BREATH: 0
VOMITING: 0
FEVER: 0
PALPITATIONS: 0
DIZZINESS: 0
NAUSEA: 0
WHEEZING: 0
DEPRESSION: 0
BLURRED VISION: 0
ABDOMINAL PAIN: 0
NERVOUS/ANXIOUS: 0
CHILLS: 0
HEADACHES: 0
DIARRHEA: 0

## 2021-03-15 ASSESSMENT — SOCIAL DETERMINANTS OF HEALTH (SDOH)
HOW OFTEN DO YOU HAVE A DRINK CONTAINING ALCOHOL: NEVER
DO YOU BELONG TO ANY CLUBS OR ORGANIZATIONS SUCH AS CHURCH GROUPS UNIONS, FRATERNAL OR ATHLETIC GROUPS, OR SCHOOL GROUPS?: NO

## 2021-03-15 ASSESSMENT — FIBROSIS 4 INDEX: FIB4 SCORE: 0.63

## 2021-03-15 ASSESSMENT — PATIENT HEALTH QUESTIONNAIRE - PHQ9: CLINICAL INTERPRETATION OF PHQ2 SCORE: 0

## 2021-03-15 NOTE — PROGRESS NOTES
Subjective:   Toro Graves is a 51 y.o. male here today for   Chief Complaint   Patient presents with   • Annual Exam   • Seasonal Allergies       #Annual Exam   -Reviewed all past medical history, family history, social history.  -Reviewed all screening/vaccinations: Up-to-date with exception of Shingrix.  -Diet and Exercise: Patient is working on improving diet and exercise especially with the restrictions lifting and weather warming.  -Tobacco, alcohol, recreational drug use: Denies any tobacco, alcohol, recreational illicit drug use.  -Sexually active: Monogamous with female partner, no other sexual partners  -Occupation:  for the [employer]    #HLD  -Chronic condition, currently treating conservatively with diet and exercise.  Patient states that he is been not following any diet or exercise regimen; however, has not gained any weight either.  He is working on improving his diet and exercise regimen.  Asymptomatic at this time.    #Allergies:  -Patient has a longstanding history of seasonal and nonseasonal allergies.  He does treat occasionally with intranasal corticosteroid, second-generation antihistamine but only gets some slight relief.  He is looking for further treatment possibilities and is requesting referral to allergist at this time.        Allergies   Allergen Reactions   • Pcn [Penicillins] Anaphylaxis     Per patient         Current medicines (including changes today)  No current outpatient medications on file.     No current facility-administered medications for this visit.     He  has a past medical history of Allergy, Asthma, and Bronchitis.    ROS   Review of Systems   Constitutional: Negative for chills and fever.   Eyes: Negative for blurred vision.   Respiratory: Negative for shortness of breath and wheezing.    Cardiovascular: Negative for chest pain and palpitations.   Gastrointestinal: Negative for abdominal pain, diarrhea, nausea and vomiting.   Neurological: Negative  "for dizziness and headaches.   Psychiatric/Behavioral: Negative for depression. The patient is not nervous/anxious.         Objective:     Physical Exam:  /74   Pulse 75   Temp 36.6 °C (97.8 °F) (Temporal)   Resp 13   Ht 1.829 m (6' 0.01\")   Wt 111 kg (245 lb)   SpO2 97%  Body mass index is 33.22 kg/m².   Constitutional: Alert, no distress.  Skin: Warm, dry, good turgor, no rashes in visible areas.  Eye: Equal, round and reactive, conjunctiva clear, lids normal.  ENMT: TM's clear bilaterally, lips without lesions, good dentition, oropharynx clear.  Neck: Trachea midline, no masses, no thyromegaly. No cervical or supraclavicular lymphadenopathy.  Respiratory: Unlabored respiratory effort, lungs clear to auscultation, no wheezes, no rhonchi.  Cardiovascular: Normal S1, S2, no murmur, no edema.  Abdomen: Soft, non-tender, no masses, no hepatosplenomegaly.  Psych: Alert and oriented x3, normal affect and mood.    Assessment and Plan:     1. Annual physical exam  -All questions concerns were answered at this time.  -Vaccinations/screenings needed at this time: Shingrix, labs needed.   -Labs reviewed, no concerns, will recheck labs as below.   -Discussed the importance of a healthy, Mediterranean style diet, routine exercise regimen.  -Discussed general safety measures including seatbelts, helmets, avoidance of smoking, sunscreen, hydration.  -Follow-up for general physical exam on a yearly basis, sooner if needed.  - CBC WITHOUT DIFFERENTIAL; Future  - Comp Metabolic Panel; Future    2. Non-seasonal allergic rhinitis, unspecified trigger  -We will oblige patient refer to allergist at this time.  Continue second-generation antihistamine, intranasal corticosteroid.  - REFERRAL TO ALLERGY    3. Mixed dyslipidemia  -Encourage patient continue appropriate diet, exercise regimen.  We will recheck lipid profile at this time.  - Lipid Profile; Future    4. Need for vaccination  -.Patient was agreeable to receiving " the shingrix vaccine in clinic today after discussion of potential risks, benefits, and side effects. Vaccine was administered without adverse effects.  - Shingles Vaccine (SHINGRIX)    Followup: Return in about 1 year (around 3/15/2022), or if symptoms worsen or fail to improve.         PLEASE NOTE: This dictation was created using voice recognition software. I have made every reasonable attempt to correct obvious errors, but I expect that there are errors of grammar and possibly content that I did not discover before finalizing the note.

## 2021-03-18 ENCOUNTER — HOSPITAL ENCOUNTER (OUTPATIENT)
Dept: LAB | Facility: MEDICAL CENTER | Age: 51
End: 2021-03-18
Attending: FAMILY MEDICINE
Payer: COMMERCIAL

## 2021-03-18 DIAGNOSIS — E78.2 MIXED DYSLIPIDEMIA: ICD-10-CM

## 2021-03-18 DIAGNOSIS — Z00.00 ANNUAL PHYSICAL EXAM: ICD-10-CM

## 2021-03-18 LAB
ALBUMIN SERPL BCP-MCNC: 4 G/DL (ref 3.2–4.9)
ALBUMIN/GLOB SERPL: 1.3 G/DL
ALP SERPL-CCNC: 126 U/L (ref 30–99)
ALT SERPL-CCNC: 131 U/L (ref 2–50)
ANION GAP SERPL CALC-SCNC: 7 MMOL/L (ref 7–16)
AST SERPL-CCNC: 98 U/L (ref 12–45)
BILIRUB SERPL-MCNC: 0.4 MG/DL (ref 0.1–1.5)
BUN SERPL-MCNC: 13 MG/DL (ref 8–22)
CALCIUM SERPL-MCNC: 9.2 MG/DL (ref 8.5–10.5)
CHLORIDE SERPL-SCNC: 107 MMOL/L (ref 96–112)
CHOLEST SERPL-MCNC: 181 MG/DL (ref 100–199)
CO2 SERPL-SCNC: 24 MMOL/L (ref 20–33)
CREAT SERPL-MCNC: 0.89 MG/DL (ref 0.5–1.4)
ERYTHROCYTE [DISTWIDTH] IN BLOOD BY AUTOMATED COUNT: 36.5 FL (ref 35.9–50)
FASTING STATUS PATIENT QL REPORTED: NORMAL
GLOBULIN SER CALC-MCNC: 3 G/DL (ref 1.9–3.5)
GLUCOSE SERPL-MCNC: 106 MG/DL (ref 65–99)
HCT VFR BLD AUTO: 42 % (ref 42–52)
HDLC SERPL-MCNC: 30 MG/DL
HGB BLD-MCNC: 14.8 G/DL (ref 14–18)
LDLC SERPL CALC-MCNC: 110 MG/DL
MCH RBC QN AUTO: 29.3 PG (ref 27–33)
MCHC RBC AUTO-ENTMCNC: 35.2 G/DL (ref 33.7–35.3)
MCV RBC AUTO: 83.2 FL (ref 81.4–97.8)
PLATELET # BLD AUTO: 246 K/UL (ref 164–446)
PMV BLD AUTO: 9.4 FL (ref 9–12.9)
POTASSIUM SERPL-SCNC: 4.1 MMOL/L (ref 3.6–5.5)
PROT SERPL-MCNC: 7 G/DL (ref 6–8.2)
RBC # BLD AUTO: 5.05 M/UL (ref 4.7–6.1)
SODIUM SERPL-SCNC: 138 MMOL/L (ref 135–145)
TRIGL SERPL-MCNC: 203 MG/DL (ref 0–149)
WBC # BLD AUTO: 4.3 K/UL (ref 4.8–10.8)

## 2021-03-18 PROCEDURE — 36415 COLL VENOUS BLD VENIPUNCTURE: CPT

## 2021-03-18 PROCEDURE — 80061 LIPID PANEL: CPT

## 2021-03-18 PROCEDURE — 80053 COMPREHEN METABOLIC PANEL: CPT

## 2021-03-18 PROCEDURE — 85027 COMPLETE CBC AUTOMATED: CPT

## 2021-07-14 ENCOUNTER — OFFICE VISIT (OUTPATIENT)
Dept: URGENT CARE | Facility: CLINIC | Age: 51
End: 2021-07-14
Payer: COMMERCIAL

## 2021-07-14 VITALS
RESPIRATION RATE: 20 BRPM | DIASTOLIC BLOOD PRESSURE: 88 MMHG | OXYGEN SATURATION: 95 % | BODY MASS INDEX: 32.47 KG/M2 | WEIGHT: 245 LBS | SYSTOLIC BLOOD PRESSURE: 144 MMHG | HEART RATE: 112 BPM | TEMPERATURE: 99.4 F | HEIGHT: 73 IN

## 2021-07-14 DIAGNOSIS — J06.9 VIRAL URI WITH COUGH: ICD-10-CM

## 2021-07-14 PROCEDURE — 99213 OFFICE O/P EST LOW 20 MIN: CPT | Performed by: PHYSICIAN ASSISTANT

## 2021-07-14 RX ORDER — DEXTROMETHORPHAN HYDROBROMIDE AND PROMETHAZINE HYDROCHLORIDE 15; 6.25 MG/5ML; MG/5ML
5 SYRUP ORAL EVERY 4 HOURS PRN
Qty: 120 ML | Refills: 0 | Status: SHIPPED | OUTPATIENT
Start: 2021-07-14 | End: 2022-01-03

## 2021-07-14 RX ORDER — METHYLPREDNISOLONE 4 MG/1
TABLET ORAL
Qty: 21 TABLET | Refills: 0 | Status: SHIPPED | OUTPATIENT
Start: 2021-07-14 | End: 2022-01-03

## 2021-07-14 ASSESSMENT — ENCOUNTER SYMPTOMS
HEMOPTYSIS: 0
RHINORRHEA: 1
DIARRHEA: 0
SPUTUM PRODUCTION: 1
SORE THROAT: 1
VOMITING: 0
NAUSEA: 0
SHORTNESS OF BREATH: 1
COUGH: 1
WHEEZING: 0
FEVER: 0
ABDOMINAL PAIN: 0

## 2021-07-14 ASSESSMENT — FIBROSIS 4 INDEX: FIB4 SCORE: 1.81

## 2021-07-14 NOTE — PROGRESS NOTES
Subjective:   Toro Graves is a 51 y.o. male who presents for Cough (x 2days, sore throat from the coughing, concerns about bronchitis ) and Sore Throat      Cough  This is a new problem. The current episode started yesterday. The problem has been gradually worsening. The problem occurs constantly. The cough is productive of purulent sputum. Associated symptoms include nasal congestion, postnasal drip, rhinorrhea, a sore throat and shortness of breath (intermittent with activity and coughing ). Pertinent negatives include no chest pain, ear congestion, ear pain, fever, hemoptysis or wheezing. Risk factors: nonsmoker  His past medical history is significant for pneumonia (when younger ). There is no history of asthma.    Up to date on COVID. History of MICHAEL and uses CPAP, seasonal allergies. History of pneumonia when he was younger.     Review of Systems   Constitutional: Negative for fever.   HENT: Positive for congestion, postnasal drip, rhinorrhea and sore throat. Negative for ear pain.    Respiratory: Positive for cough, sputum production and shortness of breath (intermittent with activity and coughing ). Negative for hemoptysis and wheezing.    Cardiovascular: Negative for chest pain.   Gastrointestinal: Negative for abdominal pain, diarrhea, nausea and vomiting.       Medications:    • This patient does not have an active medication from one of the medication groupers.    Allergies: Pcn [penicillins]    Problem List: Toro Graves does not have any pertinent problems on file.    Surgical History:  No past surgical history on file.    Past Social Hx: Toro Graves  reports that he quit smoking about 12 years ago. His smoking use included cigarettes. He has a 30.00 pack-year smoking history. He has never used smokeless tobacco. He reports that he does not drink alcohol and does not use drugs.     Past Family Hx:  Toro Graves family history includes Diabetes in his  "father; Heart Disease in his father; No Known Problems in his brother, brother, and mother.     Problem list, medications, and allergies reviewed by myself today in Epic.     Objective:     /88   Pulse (!) 112   Temp 37.4 °C (99.4 °F) (Temporal)   Resp 20   Ht 1.854 m (6' 1\")   Wt 111 kg (245 lb)   SpO2 95%   BMI 32.32 kg/m²     Physical Exam  Vitals reviewed.   Constitutional:       General: He is not in acute distress.     Appearance: Normal appearance. He is not ill-appearing or toxic-appearing.   HENT:      Mouth/Throat:      Mouth: Mucous membranes are moist.      Pharynx: Uvula midline. Posterior oropharyngeal erythema present. No pharyngeal swelling, oropharyngeal exudate or uvula swelling.      Tonsils: No tonsillar exudate or tonsillar abscesses.   Eyes:      Conjunctiva/sclera: Conjunctivae normal.      Pupils: Pupils are equal, round, and reactive to light.   Cardiovascular:      Rate and Rhythm: Normal rate and regular rhythm.      Heart sounds: Normal heart sounds.   Pulmonary:      Effort: Pulmonary effort is normal. No respiratory distress.      Breath sounds: Normal breath sounds. No wheezing, rhonchi or rales.   Musculoskeletal:      Cervical back: Neck supple.   Lymphadenopathy:      Cervical: No cervical adenopathy.   Skin:     General: Skin is warm and dry.   Neurological:      General: No focal deficit present.      Mental Status: He is alert and oriented to person, place, and time.   Psychiatric:         Mood and Affect: Mood normal.         Behavior: Behavior normal.         Assessment/Associated Orders     1. Viral URI with cough  promethazine-dextromethorphan (PROMETHAZINE-DM) 6.25-15 MG/5ML syrup    methylPREDNISolone (MEDROL DOSEPAK) 4 MG Tablet Therapy Pack       Medical Decision Making      Discussed with patient signs and symptoms most likely are due to a viral upper respiratory infection. Patient also has a history of allergies.     Symptomatic and supportive care: "   Plenty of oral hydration and rest   Treatment as above. Discussed SEs. Avoid NSAIDs with medrol dose ignacio.   Tylenol or ibuprofen for pain and fever as directed.   Warm salt water gargles for sore throat, soft foods, cool liquids.   Saline nasal spray and Flonase as a decongestant.   Overall, the patient is well-appearing. They are not hypoxic, afebrile, and a normal pulmonary exam.    I personally reviewed prior external notes and test results pertinent to today's visit. Red flags discussed and indications to present to the Emergency Department. Supportive care, natural history, differential diagnoses, and indications for immediate follow-up discussed. Patient expresses understanding and agrees to plan. Patient denies any other questions or concerns.     Advised the patient to follow-up with the primary care physician for recheck, reevaluation, and consideration of further management.    Please note that this dictation was created using voice recognition software. I have made a reasonable attempt to correct obvious errors, but I expect that there are errors of grammar and possibly content that I did not discover before finalizing the note.    This note was electronically signed by Jaylen Kaba PA-C

## 2021-07-19 ENCOUNTER — TELEPHONE (OUTPATIENT)
Dept: MEDICAL GROUP | Facility: LAB | Age: 51
End: 2021-07-19

## 2021-07-19 NOTE — TELEPHONE ENCOUNTER
----- Message from Zakiya Novak sent at 7/17/2021 11:04 AM PDT -----  Regarding: FW: skin tags   Contact: [phone]    ----- Message -----  From: Toro Graves  Sent: 7/11/2021  11:11 AM PDT  To: [facility]  Subject: skin tags                                        Topic: Referral Status    Can you schedule me an appointment with a dermatologist.  Thank you

## 2021-10-25 DIAGNOSIS — Z12.83 SKIN CANCER SCREENING: ICD-10-CM

## 2022-01-03 ENCOUNTER — HOSPITAL ENCOUNTER (OUTPATIENT)
Facility: MEDICAL CENTER | Age: 52
End: 2022-01-03
Attending: FAMILY MEDICINE
Payer: COMMERCIAL

## 2022-01-03 ENCOUNTER — OFFICE VISIT (OUTPATIENT)
Dept: URGENT CARE | Facility: PHYSICIAN GROUP | Age: 52
End: 2022-01-03
Payer: COMMERCIAL

## 2022-01-03 VITALS
SYSTOLIC BLOOD PRESSURE: 118 MMHG | HEART RATE: 75 BPM | DIASTOLIC BLOOD PRESSURE: 70 MMHG | OXYGEN SATURATION: 96 % | TEMPERATURE: 97.9 F | BODY MASS INDEX: 32.47 KG/M2 | HEIGHT: 73 IN | WEIGHT: 245 LBS

## 2022-01-03 DIAGNOSIS — Z20.822 SUSPECTED COVID-19 VIRUS INFECTION: ICD-10-CM

## 2022-01-03 PROCEDURE — U0005 INFEC AGEN DETEC AMPLI PROBE: HCPCS

## 2022-01-03 PROCEDURE — 99213 OFFICE O/P EST LOW 20 MIN: CPT | Mod: CS | Performed by: FAMILY MEDICINE

## 2022-01-03 PROCEDURE — U0003 INFECTIOUS AGENT DETECTION BY NUCLEIC ACID (DNA OR RNA); SEVERE ACUTE RESPIRATORY SYNDROME CORONAVIRUS 2 (SARS-COV-2) (CORONAVIRUS DISEASE [COVID-19]), AMPLIFIED PROBE TECHNIQUE, MAKING USE OF HIGH THROUGHPUT TECHNOLOGIES AS DESCRIBED BY CMS-2020-01-R: HCPCS

## 2022-01-03 RX ORDER — DEXTROMETHORPHAN HYDROBROMIDE AND PROMETHAZINE HYDROCHLORIDE 15; 6.25 MG/5ML; MG/5ML
5 SYRUP ORAL EVERY 4 HOURS PRN
Qty: 120 ML | Refills: 0 | Status: SHIPPED | OUTPATIENT
Start: 2022-01-03 | End: 2022-09-19

## 2022-01-03 RX ORDER — CETIRIZINE HYDROCHLORIDE 10 MG/1
10 TABLET ORAL DAILY
COMMUNITY

## 2022-01-03 ASSESSMENT — ENCOUNTER SYMPTOMS
VOMITING: 0
SORE THROAT: 1
FEVER: 0
COUGH: 1

## 2022-01-03 ASSESSMENT — FIBROSIS 4 INDEX: FIB4 SCORE: 1.81

## 2022-01-03 NOTE — LETTER
January 3, 2022    To Whom It May Concern:         This is confirmation that Toro Graves attended his scheduled appointment with Dat Lilly M.D. on 1/03/22.  Patient tested for COVID-19 with PCR testing.  Patient recommended to stay on home quarantine until PCR testing complete (anticipated 24 to 72 hours).  If test results were to be positive, patient must remain on quarantine until 5 days have passed since onset of symptoms and symptoms are also resolving/patient is asymptomatic.  Thank you for making appropriate accommodations as they recover.           If you have any questions please do not hesitate to call me at the phone number listed below.    Sincerely,          aDt Lilly M.D.  [phone]

## 2022-01-03 NOTE — PROGRESS NOTES
"Subjective:     Toro Graves is a 51 y.o. male who presents for Wheezing (ear [id number] )    HPI  Pt presents for evaluation of an acute problem  Patient with an acute illness for the past 1 week  Having wheezing and coughing   Feeling very fatigued   Having ear pain bilaterally     Review of Systems   Constitutional: Negative for fever.   HENT: Positive for congestion and sore throat.    Respiratory: Positive for cough.    Gastrointestinal: Negative for vomiting.   Skin: Negative for rash.       PMH:  has a past medical history of Allergy, Asthma, and Bronchitis.  MEDS:   Current Outpatient Medications:   •  cetirizine (ZYRTEC) 10 MG Tab, Take 10 mg by mouth every day., Disp: , Rfl:   ALLERGIES:   Allergies   Allergen Reactions   • Pcn [Penicillins] Anaphylaxis     Per patient     SURGHX: History reviewed. No pertinent surgical history.  SOCHX:  reports that he quit smoking about 13 years ago. His smoking use included cigarettes. He has a 30.00 pack-year smoking history. He has never used smokeless tobacco. He reports that he does not drink alcohol and does not use drugs.     Objective:   /70   Pulse 75   Temp 36.6 °C (97.9 °F) (Temporal)   Ht 1.854 m (6' 1\")   Wt 111 kg (245 lb)   SpO2 96%   BMI 32.32 kg/m²     Physical Exam  Constitutional:       General: He is not in acute distress.     Appearance: He is well-developed. He is not diaphoretic.   HENT:      Head: Normocephalic and atraumatic.      Right Ear: Tympanic membrane, ear canal and external ear normal.      Left Ear: Tympanic membrane, ear canal and external ear normal.      Nose: Congestion and rhinorrhea present.      Mouth/Throat:      Mouth: Mucous membranes are moist.      Pharynx: Oropharynx is clear. No oropharyngeal exudate or posterior oropharyngeal erythema.   Neck:      Trachea: No tracheal deviation.   Cardiovascular:      Rate and Rhythm: Normal rate and regular rhythm.      Heart sounds: Normal heart sounds.   Pulmonary:     "  Effort: Pulmonary effort is normal. No respiratory distress.      Breath sounds: Normal breath sounds. No wheezing or rales.   Musculoskeletal:         General: Normal range of motion.      Cervical back: Normal range of motion and neck supple. No tenderness.   Lymphadenopathy:      Cervical: No cervical adenopathy.   Skin:     General: Skin is warm and dry.      Findings: No rash.   Neurological:      Mental Status: He is alert.   Psychiatric:         Behavior: Behavior normal.         Thought Content: Thought content normal.         Judgment: Judgment normal.         Assessment/Plan:   Assessment    1. Suspected COVID-19 virus infection  - SARS-CoV-2 PCR (24 hour In-House): Collect NP swab in VTM; Future  - promethazine-dextromethorphan (PROMETHAZINE-DM) 6.25-15 MG/5ML syrup; Take 5 mL by mouth every four hours as needed for Cough.  Dispense: 120 mL; Refill: 0    Other orders  - cetirizine (ZYRTEC) 10 MG Tab; Take 10 mg by mouth every day.      Patient with COVID-19 versus viral URI.  Patient does not have any findings which indicate need for hospitalization, and will be managed on outpatient basis.  Will send COVID-19 PCR testing. Reviewed home isolation protocol, medication use for symptomatic management, and follow-up precautions if symptoms should worsen.

## 2022-01-04 DIAGNOSIS — Z20.822 SUSPECTED COVID-19 VIRUS INFECTION: ICD-10-CM

## 2022-01-04 LAB
COVID ORDER STATUS COVID19: NORMAL
SARS-COV-2 RNA RESP QL NAA+PROBE: NOTDETECTED
SPECIMEN SOURCE: NORMAL

## 2022-01-26 ENCOUNTER — OFFICE VISIT (OUTPATIENT)
Dept: DERMATOLOGY | Facility: IMAGING CENTER | Age: 52
End: 2022-01-26
Payer: COMMERCIAL

## 2022-01-26 VITALS — BODY MASS INDEX: 32.47 KG/M2 | HEIGHT: 73 IN | TEMPERATURE: 98.2 F | WEIGHT: 245 LBS

## 2022-01-26 DIAGNOSIS — D48.9 NEOPLASM OF UNCERTAIN BEHAVIOR: ICD-10-CM

## 2022-01-26 DIAGNOSIS — L81.4 LENTIGINES: ICD-10-CM

## 2022-01-26 DIAGNOSIS — L82.1 SK (SEBORRHEIC KERATOSIS): ICD-10-CM

## 2022-01-26 DIAGNOSIS — D18.01 CHERRY ANGIOMA: ICD-10-CM

## 2022-01-26 DIAGNOSIS — L91.8 ACROCHORDON: ICD-10-CM

## 2022-01-26 DIAGNOSIS — Z12.83 SKIN CANCER SCREENING: ICD-10-CM

## 2022-01-26 DIAGNOSIS — D22.9 MULTIPLE NEVI: ICD-10-CM

## 2022-01-26 PROCEDURE — 11102 TANGNTL BX SKIN SINGLE LES: CPT | Performed by: NURSE PRACTITIONER

## 2022-01-26 PROCEDURE — 99213 OFFICE O/P EST LOW 20 MIN: CPT | Mod: 25 | Performed by: NURSE PRACTITIONER

## 2022-01-26 ASSESSMENT — FIBROSIS 4 INDEX: FIB4 SCORE: 1.81

## 2022-01-26 NOTE — PROGRESS NOTES
"DERMATOLOGY NOTE  NEW VISIT  Procedure room     Chief complaint: Establish Care (ERIK)     HPI/location: LT nasal sidewall, brownmacule  Time present: 1 month  Painful lesion: No  Itching lesion: No  Enlarging lesion: Yes  Anything make it better or worse? No    HPI/location: RT cheel, brown papule  Time present: 1 month  Painful lesion: No  Itching lesion: Yes  Enlarging lesion: Yes  Anything make it better or worse? No    HPI/location: RT neck, brown skin tag, recent color change, irritating  Time present: 2 years  Painful lesion: No  Itching lesion: No  Enlarging lesion: No  Anything make it better or worse?      History of skin cancer: No  History of precancers/actinic keratoses: No  History of biopsies:No  History of blistering/severe sunburns:Yes, Details: childhood  Family history of skin cancer:No, Patient states unknown  Family history of atypical moles:No        Chief Complaint   Patient presents with   • Establish Care     ERIK          REVIEW OF SYSTEMS:   Positive for skin (see HPI)  Negative for fevers and chills       EXAM:  Temp 36.8 °C (98.2 °F)   Ht 1.854 m (6' 1\")   Wt 111 kg (245 lb)   BMI 32.32 kg/m²   Constitutional: Well-developed, well-nourished, and in no distress.     A total body skin exam was performed excluding the genitals per patient preference and including the following areas: head (including face), neck, chest, abdomen, groin/buttocks, back, bilateral upper extremities, and bilateral lower extremities with the following pertinent findings listed below and/or in assessment/plan.     -sun exposed skin of trunk and b/l upper and lower extremities with scattered clinically benign light brown reticulated macules all of which were morphologically similar and none of which were suspicious for skin cancer today on exam    few medium brown stuck-on waxy papules scattered on the R face     Multiple medium brown macules papules scattered over the trunk, extremities     Several scattered 1-3mm " bright red macules and thin papules on the trunk and extremities and scalp.    Acrochordon neck, b/l axilla    5mm pedunculated tan lesion to R neck        IMPRESSION / PLAN:    1. Neoplasm of uncertain behavior  Procedure Note   Procedure: Biopsy by shave technique  Location: R neck  Size: as noted in exam  Preoperative diagnosis:likely acrochordon, r/o atypia  Risks, benefits and alternatives of procedure discussed, verbal consent obtained for photo (see chart) and written informed consent obtained for procedure. Time out completed. Area of biopsy prepped with alcohol. Anesthesia with 1% lidocaine with epinephrine administered with 30 gauge needle. Shave biopsy of the site performed. Hemostasis achieved with pressure. Vaseline applied to wound with bandage. Patient tolerated procedure well and there were no complications. The specimen was sent to the pathology lab by the staff. Wound care was discussed.      2. Lentigines  - Benign-appearing nature of lesions discussed. Advised to return to clinic for any new or concerning changes.      3. Multiple nevi  - Benign-appearing nature of lesions discussed. Advised to return to clinic for any new or concerning changes.  - ABCDE's of melanoma discussed      4. Cherry angioma  - Benign-appearing nature of lesions discussed. Advised to return to clinic for any new or concerning changes.      5. Acrochordon  - Benign-appearing nature of lesions discussed. Advised to return to clinic for any new or concerning changes.      6. SK (seborrheic keratosis)  - Benign-appearing nature of lesions discussed. Advised to return to clinic for any new or concerning changes.      7. Skin cancer screening  Skin cancer education  - discussed importance of sun protective clothing, eyewear  - discussed importance of daily use of broad spectrum sunscreen with SPF 30 or greater, as well as need for reapplication ~every 2 hours when exposed to UVR  - discussed importance of regular self-exams,  ideally once per month, every 12 months exams in clinic  - KELSIE's of melanoma discussed  - patient to bring any new or concerning lesions to my attention  - Patient educational handout provided and reviewed with patient    Discussed risks, benefits, alternative treatments as well as common side effects associated with prescribed treatment  Patient verbalized understanding and agrees with plan regarding the above         Please note that this dictation was created using voice recognition software. I have made every reasonable attempt to correct obvious errors, but I expect that there are errors of grammar and possibly content that I did not discover before finalizing the note.      Return to clinic in: Return in about 1 year (around 1/26/2023) for ERIK. and as needed for any new or changing skin lesions.

## 2022-01-28 ENCOUNTER — TELEPHONE (OUTPATIENT)
Dept: DERMATOLOGY | Facility: IMAGING CENTER | Age: 52
End: 2022-01-28

## 2022-02-04 ENCOUNTER — HOSPITAL ENCOUNTER (OUTPATIENT)
Facility: MEDICAL CENTER | Age: 52
End: 2022-02-04
Attending: NURSE PRACTITIONER
Payer: COMMERCIAL

## 2022-02-04 ENCOUNTER — OFFICE VISIT (OUTPATIENT)
Dept: URGENT CARE | Facility: PHYSICIAN GROUP | Age: 52
End: 2022-02-04
Payer: COMMERCIAL

## 2022-02-04 VITALS
RESPIRATION RATE: 20 BRPM | HEART RATE: 96 BPM | WEIGHT: 245 LBS | HEIGHT: 73 IN | BODY MASS INDEX: 32.47 KG/M2 | SYSTOLIC BLOOD PRESSURE: 120 MMHG | TEMPERATURE: 98.3 F | DIASTOLIC BLOOD PRESSURE: 84 MMHG | OXYGEN SATURATION: 97 %

## 2022-02-04 DIAGNOSIS — Z20.822 EXPOSURE TO COVID-19 VIRUS: ICD-10-CM

## 2022-02-04 DIAGNOSIS — J06.9 VIRAL URI WITH COUGH: ICD-10-CM

## 2022-02-04 PROCEDURE — 99213 OFFICE O/P EST LOW 20 MIN: CPT | Mod: CS | Performed by: NURSE PRACTITIONER

## 2022-02-04 PROCEDURE — U0005 INFEC AGEN DETEC AMPLI PROBE: HCPCS

## 2022-02-04 PROCEDURE — U0003 INFECTIOUS AGENT DETECTION BY NUCLEIC ACID (DNA OR RNA); SEVERE ACUTE RESPIRATORY SYNDROME CORONAVIRUS 2 (SARS-COV-2) (CORONAVIRUS DISEASE [COVID-19]), AMPLIFIED PROBE TECHNIQUE, MAKING USE OF HIGH THROUGHPUT TECHNOLOGIES AS DESCRIBED BY CMS-2020-01-R: HCPCS

## 2022-02-04 ASSESSMENT — ENCOUNTER SYMPTOMS
SORE THROAT: 1
CONSTITUTIONAL NEGATIVE: 1
COUGH: 1
SHORTNESS OF BREATH: 0
FEVER: 0
CHILLS: 0

## 2022-02-04 ASSESSMENT — VISUAL ACUITY: OU: 1

## 2022-02-04 ASSESSMENT — FIBROSIS 4 INDEX: FIB4 SCORE: 1.81

## 2022-02-04 NOTE — PROGRESS NOTES
"Subjective:     Toro Graves is a 51 y.o. male who presents for Pharyngitis (sinus drainage, cough x2 days )       Pharyngitis   This is a new problem. The maximum temperature recorded prior to his arrival was more than 104 F. Associated symptoms include congestion and coughing. Pertinent negatives include no shortness of breath. Treatments tried: Cough drops.     Patient reporting exposure to COVID-19.    Reports that 2 days ago, he started to develop a cough.  Started to develop a throat yesterday.  Also reports sinus drainage.  Denies fever or chills.    During this visit, appropriate PPE was worn, hand hygiene was performed, and the patient and any visitors were masked.     PMH:  has a past medical history of Allergy, Asthma, and Bronchitis.    MEDS:   Current Outpatient Medications:   •  cetirizine (ZYRTEC) 10 MG Tab, Take 10 mg by mouth every day., Disp: , Rfl:   •  promethazine-dextromethorphan (PROMETHAZINE-DM) 6.25-15 MG/5ML syrup, Take 5 mL by mouth every four hours as needed for Cough. (Patient not taking: Reported on 1/26/2022), Disp: 120 mL, Rfl: 0    ALLERGIES:   Allergies   Allergen Reactions   • Pcn [Penicillins] Anaphylaxis     Per patient     SURGHX: No past surgical history on file.    SOCHX:  reports that he quit smoking about 13 years ago. His smoking use included cigarettes. He has a 30.00 pack-year smoking history. He has never used smokeless tobacco. He reports that he does not drink alcohol and does not use drugs.     FH: Reviewed with patient, not pertinent to this visit.    Review of Systems   Constitutional: Negative.  Negative for chills, fever and malaise/fatigue.   HENT: Positive for congestion and sore throat.    Respiratory: Positive for cough. Negative for shortness of breath.    All other systems reviewed and are negative.    Additional details per HPI.      Objective:     /84   Pulse 96   Temp 36.8 °C (98.3 °F) (Temporal)   Resp 20   Ht 1.854 m (6' 1\")   Wt 111 kg " (245 lb)   SpO2 97%   BMI 32.32 kg/m²     Physical Exam  Vitals reviewed.   Constitutional:       General: He is not in acute distress.     Appearance: He is well-developed. He is not ill-appearing or toxic-appearing.   HENT:      Head: Normocephalic.      Right Ear: External ear normal.      Left Ear: External ear normal.      Nose: Congestion present.      Mouth/Throat:      Mouth: Mucous membranes are moist.      Pharynx: Oropharynx is clear.   Eyes:      General: Vision grossly intact.      Extraocular Movements: Extraocular movements intact.      Conjunctiva/sclera: Conjunctivae normal.   Cardiovascular:      Rate and Rhythm: Normal rate and regular rhythm.      Heart sounds: Normal heart sounds.   Pulmonary:      Effort: Pulmonary effort is normal. No respiratory distress.      Breath sounds: Normal breath sounds. No decreased breath sounds, wheezing, rhonchi or rales.   Musculoskeletal:         General: No deformity. Normal range of motion.      Cervical back: Normal range of motion.   Skin:     General: Skin is warm and dry.      Coloration: Skin is not pale.   Neurological:      Mental Status: He is alert and oriented to person, place, and time.      Sensory: No sensory deficit.      Motor: No weakness.   Psychiatric:         Behavior: Behavior normal. Behavior is cooperative.       Assessment/Plan:     1. Viral URI with cough  - COVID/SARS CoV-2 PCR; Future    2. Exposure to COVID-19 virus    Discussed likely self-limiting viral etiology and expected course and duration of illness. Vital signs stable, afebrile, no acute distress at this time.     Differential diagnosis, natural history, supportive care, over-the-counter symptom management per 's instructions, close monitoring, and indications for immediate follow-up discussed.     OTC list given.    COVID-19 PCR test pending. Patient is advised to self-isolate as recommended by the CDC.    • Children and adults with mild, symptomatic  COVID-19: Isolation can end at least 5 days after symptom onset and after fever ends for 24 hours (without the use of fever-reducing medication) and symptoms are improving, if these people can continue to properly wear a well-fitted mask around others for 5 more days after the 5-day isolation period. Day 0 is the first day of symptoms.  • People who are infected but asymptomatic (never develop symptoms): Isolation can end at least 5 days after the first positive test (with day 0 being the date their specimen was collected for the positive test), if these people can continue to wear a properly well-fitted mask around others for 5 more days after the 5-day isolation period. However, if symptoms develop after a positive test, their 5-day isolation period should start over (day 0 changes to the first day of symptoms).  • People who have moderate COVID-19 illness: Isolate for 10 days.  • People who are severely ill (i.e., requiring hospitalization, intensive care, or ventilation support): Extending the duration of isolation and precautions to at least 10 days and up to 20 days after symptom onset, and after fever ends (without the use of fever-reducing medication) and symptoms are improving, may be warranted.  • People who are moderately or severely immunocompromised might have a longer infectious period: Extend isolation to 20 or more days (day 0 is the first day of symptoms or a positive viral test). Use a test-based strategy and consult with an infectious disease specialist to determine the appropriate duration of isolation and precautions.  • Recovered patients: Patients who have recovered from COVID-19 can continue to have detectable SARS-CoV-2 RNA in upper respiratory specimens for up to 3 months after illness onset. However, replication-competent virus has not been reliably recovered from such patients, and they are not likely infectious.  • Available data suggest that patients with mild-to-moderate COVID-19  remain infectious no longer than 10 days after symptom onset.     Please visit https://www.cdc.gov/coronavirus/2019-ncov/hcp/duration-isolation.html for further information.     All questions answered. Patient agrees with the plan of care.    Discharge summary provided.    Note provided.

## 2022-02-04 NOTE — PATIENT INSTRUCTIONS
Viral Respiratory Infection  A respiratory infection is an illness that affects part of the respiratory system, such as the lungs, nose, or throat. A respiratory infection that is caused by a virus is called a viral respiratory infection.  Common types of viral respiratory infections include:  · A cold.  · The flu (influenza).  · A respiratory syncytial virus (RSV) infection.  What are the causes?  This condition is caused by a virus.  What are the signs or symptoms?  Symptoms of this condition include:  · A stuffy or runny nose.  · Yellow or green nasal discharge.  · A cough.  · Sneezing.  · Fatigue.  · Achy muscles.  · A sore throat.  · Sweating or chills.  · A fever.  · A headache.  How is this diagnosed?  This condition may be diagnosed based on:  · Your symptoms.  · A physical exam.  · Testing of nasal swabs.  How is this treated?  This condition may be treated with medicines, such as:  · Antiviral medicine. This may shorten the length of time a person has symptoms.  · Expectorants. These make it easier to cough up mucus.  · Decongestant nasal sprays.  · Acetaminophen or NSAIDs to relieve fever and pain.  Antibiotic medicines are not prescribed for viral infections. This is because antibiotics are designed to kill bacteria. They are not effective against viruses.  Follow these instructions at home:    Managing pain and congestion  · Take over-the-counter and prescription medicines only as told by your health care provider.  · If you have a sore throat, gargle with a salt-water mixture 3-4 times a day or as needed. To make a salt-water mixture, completely dissolve ½-1 tsp of salt in 1 cup of warm water.  · Use nose drops made from salt water to ease congestion and soften raw skin around your nose.  · Drink enough fluid to keep your urine pale yellow. This helps prevent dehydration and helps loosen up mucus.  General instructions  · Rest as much as possible.  · Do not drink alcohol.  · Do not use any products  that contain nicotine or tobacco, such as cigarettes and e-cigarettes. If you need help quitting, ask your health care provider.  · Keep all follow-up visits as told by your health care provider. This is important.  How is this prevented?    · Get an annual flu shot. You may get the flu shot in late summer, fall, or winter. Ask your health care provider when you should get your flu shot.  · Avoid exposing others to your respiratory infection.  ? Stay home from work or school as told by your health care provider.  ? Wash your hands with soap and water often, especially after you cough or sneeze. If soap and water are not available, use alcohol-based hand .  · Avoid contact with people who are sick during cold and flu season. This is generally fall and winter.  Contact a health care provider if:  · Your symptoms last for 10 days or longer.  · Your symptoms get worse over time.  · You have a fever.  · You have severe sinus pain in your face or forehead.  · The glands in your jaw or neck become very swollen.  Get help right away if you:  · Feel pain or pressure in your chest.  · Have shortness of breath.  · Faint or feel like you will faint.  · Have severe and persistent vomiting.  · Feel confused or disoriented.  Summary  · A respiratory infection is an illness that affects part of the respiratory system, such as the lungs, nose, or throat. A respiratory infection that is caused by a virus is called a viral respiratory infection.  · Common types of viral respiratory infections are a cold, influenza, and respiratory syncytial virus (RSV) infection.  · Symptoms of this condition include a stuffy or runny nose, cough, sneezing, fatigue, achy muscles, sore throat, and fevers or chills.  · Antibiotic medicines are not prescribed for viral infections. This is because antibiotics are designed to kill bacteria. They are not effective against viruses.  This information is not intended to replace advice given to you by  your health care provider. Make sure you discuss any questions you have with your health care provider.  Document Released: 09/27/2006 Document Revised: 12/26/2019 Document Reviewed: 01/28/2019  [employer] Patient Education © 2020 [employer]      COVID-19 PCR test pending. Patient is advised to self-isolate as recommended by the CDC.    • Children and adults with mild, symptomatic COVID-19: Isolation can end at least 5 days after symptom onset and after fever ends for 24 hours (without the use of fever-reducing medication) and symptoms are improving, if these people can continue to properly wear a well-fitted mask around others for 5 more days after the 5-day isolation period. Day 0 is the first day of symptoms.  • People who are infected but asymptomatic (never develop symptoms): Isolation can end at least 5 days after the first positive test (with day 0 being the date their specimen was collected for the positive test), if these people can continue to wear a properly well-fitted mask around others for 5 more days after the 5-day isolation period. However, if symptoms develop after a positive test, their 5-day isolation period should start over (day 0 changes to the first day of symptoms).  • People who have moderate COVID-19 illness: Isolate for 10 days.  • People who are severely ill (i.e., requiring hospitalization, intensive care, or ventilation support): Extending the duration of isolation and precautions to at least 10 days and up to 20 days after symptom onset, and after fever ends (without the use of fever-reducing medication) and symptoms are improving, may be warranted.  • People who are moderately or severely immunocompromised might have a longer infectious period: Extend isolation to 20 or more days (day 0 is the first day of symptoms or a positive viral test). Use a test-based strategy and consult with an infectious disease specialist to determine the appropriate duration of isolation and  precautions.  • Recovered patients: Patients who have recovered from COVID-19 can continue to have detectable SARS-CoV-2 RNA in upper respiratory specimens for up to 3 months after illness onset. However, replication-competent virus has not been reliably recovered from such patients, and they are not likely infectious.  • Available data suggest that patients with mild-to-moderate COVID-19 remain infectious no longer than 10 days after symptom onset.     Please visit https://www.cdc.gov/coronavirus/2019-ncov/hcp/duration-isolation.html for further information.

## 2022-02-04 NOTE — LETTER
February 4, 2022         Patient: Toro Graves   YOB: 1970   Date of Visit: 2/4/2022           To Whom it May Concern:    Toro Graves was seen in my clinic on 2/4/2022.     COVID-19 PCR test pending. Patient is advised to self-isolate as recommended by the CDC.    • Children and adults with mild, symptomatic COVID-19: Isolation can end at least 5 days after symptom onset and after fever ends for 24 hours (without the use of fever-reducing medication) and symptoms are improving, if these people can continue to properly wear a well-fitted mask around others for 5 more days after the 5-day isolation period. Day 0 is the first day of symptoms.  • People who are infected but asymptomatic (never develop symptoms): Isolation can end at least 5 days after the first positive test (with day 0 being the date their specimen was collected for the positive test), if these people can continue to wear a properly well-fitted mask around others for 5 more days after the 5-day isolation period. However, if symptoms develop after a positive test, their 5-day isolation period should start over (day 0 changes to the first day of symptoms).  • People who have moderate COVID-19 illness: Isolate for 10 days.  • People who are severely ill (i.e., requiring hospitalization, intensive care, or ventilation support): Extending the duration of isolation and precautions to at least 10 days and up to 20 days after symptom onset, and after fever ends (without the use of fever-reducing medication) and symptoms are improving, may be warranted.  • People who are moderately or severely immunocompromised might have a longer infectious period: Extend isolation to 20 or more days (day 0 is the first day of symptoms or a positive viral test). Use a test-based strategy and consult with an infectious disease specialist to determine the appropriate duration of isolation and precautions.  • Recovered patients: Patients who have recovered  from COVID-19 can continue to have detectable SARS-CoV-2 RNA in upper respiratory specimens for up to 3 months after illness onset. However, replication-competent virus has not been reliably recovered from such patients, and they are not likely infectious.  • Available data suggest that patients with mild-to-moderate COVID-19 remain infectious no longer than 10 days after symptom onset.     Please visit https://www.cdc.gov/coronavirus/2019-ncov/hcp/duration-isolation.html for further information.    If you have any questions or concerns, please don't hesitate to call.        Sincerely,           JUSTIN Mcgraw.  Electronically Signed

## 2022-02-05 DIAGNOSIS — J06.9 VIRAL URI WITH COUGH: ICD-10-CM

## 2022-02-05 LAB
COVID ORDER STATUS COVID19: NORMAL
SARS-COV-2 RNA RESP QL NAA+PROBE: DETECTED
SPECIMEN SOURCE: ABNORMAL

## 2022-06-03 ENCOUNTER — HOSPITAL ENCOUNTER (OUTPATIENT)
Dept: RADIOLOGY | Facility: MEDICAL CENTER | Age: 52
End: 2022-06-03
Attending: NURSE PRACTITIONER
Payer: COMMERCIAL

## 2022-06-03 ENCOUNTER — OFFICE VISIT (OUTPATIENT)
Dept: URGENT CARE | Facility: PHYSICIAN GROUP | Age: 52
End: 2022-06-03
Payer: COMMERCIAL

## 2022-06-03 VITALS
BODY MASS INDEX: 32.47 KG/M2 | OXYGEN SATURATION: 96 % | WEIGHT: 245 LBS | RESPIRATION RATE: 16 BRPM | SYSTOLIC BLOOD PRESSURE: 124 MMHG | TEMPERATURE: 97 F | HEIGHT: 73 IN | HEART RATE: 79 BPM | DIASTOLIC BLOOD PRESSURE: 82 MMHG

## 2022-06-03 DIAGNOSIS — S90.112A CONTUSION OF LEFT GREAT TOE WITHOUT DAMAGE TO NAIL, INITIAL ENCOUNTER: ICD-10-CM

## 2022-06-03 DIAGNOSIS — S92.425A CLOSED NONDISPLACED FRACTURE OF DISTAL PHALANX OF LEFT GREAT TOE, INITIAL ENCOUNTER: ICD-10-CM

## 2022-06-03 PROCEDURE — 73660 X-RAY EXAM OF TOE(S): CPT | Mod: LT

## 2022-06-03 PROCEDURE — 99214 OFFICE O/P EST MOD 30 MIN: CPT | Performed by: NURSE PRACTITIONER

## 2022-06-03 RX ORDER — IBUPROFEN 800 MG/1
800 TABLET ORAL EVERY 8 HOURS PRN
Qty: 30 TABLET | Refills: 0 | Status: SHIPPED | OUTPATIENT
Start: 2022-06-03 | End: 2022-09-19

## 2022-06-03 ASSESSMENT — VISUAL ACUITY: OU: 1

## 2022-06-03 ASSESSMENT — FIBROSIS 4 INDEX: FIB4 SCORE: 1.84

## 2022-06-03 ASSESSMENT — ENCOUNTER SYMPTOMS
CONSTITUTIONAL NEGATIVE: 1
NEUROLOGICAL NEGATIVE: 1

## 2022-06-03 NOTE — PROGRESS NOTES
Subjective:     Toro Graves is a 52 y.o. male who presents for Foot Problem (L side of big toe is swollen, purple and red. Swamp cooler fell on foot 2 days ago. -+)       Foot Problem  This is a new problem. The problem has been gradually worsening.     2 nights ago, dropped swelling color on left great toe.  Has bruising, tenderness, and pain.  Had some swelling which improved.    Patient was screened prior to rooming and denied COVID-19 diagnosis or contact with a person who has been diagnosed or is suspected to have COVID-19. During this visit, appropriate PPE was worn, hand hygiene was performed, and the patient and any visitors were masked.     PMH:  has a past medical history of Allergy, Asthma, and Bronchitis.    MEDS:   Current Outpatient Medications:   •  ibuprofen (MOTRIN) 800 MG Tab, Take 1 Tablet by mouth every 8 hours as needed for Moderate Pain or Inflammation., Disp: 30 Tablet, Rfl: 0  •  cetirizine (ZYRTEC) 10 MG Tab, Take 10 mg by mouth every day., Disp: , Rfl:   •  promethazine-dextromethorphan (PROMETHAZINE-DM) 6.25-15 MG/5ML syrup, Take 5 mL by mouth every four hours as needed for Cough. (Patient not taking: No sig reported), Disp: 120 mL, Rfl: 0    ALLERGIES:   Allergies   Allergen Reactions   • Pcn [Penicillins] Anaphylaxis     Per patient     SURGHX: History reviewed. No pertinent surgical history.    SOCHX:  reports that he quit smoking about 13 years ago. His smoking use included cigarettes. He has a 30.00 pack-year smoking history. He has never used smokeless tobacco. He reports that he does not drink alcohol and does not use drugs.     FH: Reviewed with patient, not pertinent to this visit.    Review of Systems   Constitutional: Negative.    Musculoskeletal:        Left great toe injury, pain, tenderness, swelling, bruising   Neurological: Negative.    All other systems reviewed and are negative.    Additional details per HPI.      Objective:     /82   Pulse 79   Temp 36.1 °C  "(97 °F) (Tympanic)   Resp 16   Ht 1.854 m (6' 1\")   Wt 111 kg (245 lb)   SpO2 96%   BMI 32.32 kg/m²     Physical Exam  Vitals reviewed.   Constitutional:       General: He is not in acute distress.     Appearance: He is well-developed. He is not ill-appearing or toxic-appearing.   Eyes:      General: Vision grossly intact.      Extraocular Movements: Extraocular movements intact.   Cardiovascular:      Rate and Rhythm: Normal rate.   Pulmonary:      Effort: Pulmonary effort is normal. No respiratory distress.   Musculoskeletal:         General: No deformity. Normal range of motion.      Cervical back: Normal range of motion.      Left foot: Normal range of motion and normal capillary refill. Swelling and tenderness present. No deformity or laceration.      Comments: Mild swelling of left great toe, bruising, TTP   Skin:     Coloration: Skin is not pale.      Findings: Bruising present. No rash.   Neurological:      Mental Status: He is alert and oriented to person, place, and time.      Sensory: No sensory deficit.      Motor: No weakness.   Psychiatric:         Behavior: Behavior normal. Behavior is cooperative.     X-ray of left toes:    Details    Reading Physician Reading Date Result Priority   Carter Duong M.D.  [phone] [facility]     Narrative & Impression     6/3/2022 10:50 AM     HISTORY/REASON FOR EXAM:  Pain/Deformity Following Trauma.    TECHNIQUE/EXAM DESCRIPTION AND NUMBER OF VIEWS:  3 views of the LEFT toes.     COMPARISON: Prior foot x-ray dated 8/31/2020     FINDINGS:  There is a nondisplaced fracture through the proximal aspect of the distal phalanx of the left great toe this extends to the proximal articular surface no other fracture is identified     IMPRESSION:     Nondisplaced intra-articular fracture proximal portion of the distal phalanx of the left great toe           Exam Ended: 06/03/22 10:52 AM Last Resulted: 06/03/22 10:59 AM           Radiology report and images " reviewed by myself. Concur with findings.      Assessment/Plan:     1. Contusion of left great toe without damage to nail, initial encounter  - DX-TOE(S) 2+ LEFT; Future  - Referral to Podiatry  - ibuprofen (MOTRIN) 800 MG Tab; Take 1 Tablet by mouth every 8 hours as needed for Moderate Pain or Inflammation.  Dispense: 30 Tablet; Refill: 0    2. Closed nondisplaced fracture of distal phalanx of left great toe, initial encounter  - Referral to Podiatry  - ibuprofen (MOTRIN) 800 MG Tab; Take 1 Tablet by mouth every 8 hours as needed for Moderate Pain or Inflammation.  Dispense: 30 Tablet; Refill: 0    Rest, ice, compression, and elevation (RICE) and over-the-counter acetaminophen, per 's instructions, as needed for pain. Rx as above sent electronically. Short boot applied. Follow up with podiatry; referral placed.    Differential diagnosis, natural history, supportive care, over-the-counter symptom management per 's instructions, close monitoring, and indications for immediate follow-up discussed.     All questions answered. Patient agrees with the plan of care.    Discharge summary provided.

## 2022-06-03 NOTE — PATIENT INSTRUCTIONS
Details    Reading Physician Reading Date Result Priority   Carter Duong M.D.  [phone] 6/3/2022 Ur[facility]     Narrative & Impression     6/3/2022 10:50 AM     HISTORY/REASON FOR EXAM:  Pain/Deformity Following Trauma.        TECHNIQUE/EXAM DESCRIPTION AND NUMBER OF VIEWS:  3 views of the LEFT toes.     COMPARISON: Prior foot x-ray dated 8/31/2020     FINDINGS:  There is a nondisplaced fracture through the proximal aspect of the distal phalanx of the left great toe this extends to the proximal articular surface no other fracture is identified     IMPRESSION:     Nondisplaced intra-articular fracture proximal portion of the distal phalanx of the left great toe             Exam Ended: 06/03/22 10:52 AM Last Resulted: 06/03/22 10:59 AM

## 2022-06-08 ENCOUNTER — OFFICE VISIT (OUTPATIENT)
Dept: URGENT CARE | Facility: PHYSICIAN GROUP | Age: 52
End: 2022-06-08
Payer: COMMERCIAL

## 2022-06-08 VITALS
HEART RATE: 82 BPM | RESPIRATION RATE: 18 BRPM | BODY MASS INDEX: 32.47 KG/M2 | DIASTOLIC BLOOD PRESSURE: 86 MMHG | WEIGHT: 245 LBS | HEIGHT: 73 IN | SYSTOLIC BLOOD PRESSURE: 126 MMHG | OXYGEN SATURATION: 100 % | TEMPERATURE: 97.2 F

## 2022-06-08 DIAGNOSIS — S90.112A CONTUSION OF LEFT GREAT TOE WITHOUT DAMAGE TO NAIL, INITIAL ENCOUNTER: ICD-10-CM

## 2022-06-08 DIAGNOSIS — S61.314A LACERATION OF RIGHT RING FINGER WITHOUT FOREIGN BODY WITH DAMAGE TO NAIL, INITIAL ENCOUNTER: ICD-10-CM

## 2022-06-08 PROCEDURE — 12001 RPR S/N/AX/GEN/TRNK 2.5CM/<: CPT | Performed by: PHYSICIAN ASSISTANT

## 2022-06-08 ASSESSMENT — ENCOUNTER SYMPTOMS
TINGLING: 1
CHILLS: 0
FOCAL WEAKNESS: 0
FEVER: 0

## 2022-06-08 ASSESSMENT — FIBROSIS 4 INDEX: FIB4 SCORE: 1.84

## 2022-06-08 NOTE — PROGRESS NOTES
"Subjective:   Toro Graves  is a 52 y.o. male who presents for Laceration (Finger laceration on r hand ,.)      Laceration   The incident occurred 6 to 12 hours ago. His tetanus status is UTD.   Patient notes less than 3 hours status post injury to right hand fourth digit.  He states he was working on a swamp cooler and fan blade impacted side of finger causing laceration.  Patient notes small area of numbness distal to the laceration.  Tetanus is up-to-date.  Denies weakness.  Here for wound repair.  Additionally patient requests placing podiatry referral (had been seen last week for foot contusion and has not been assigned a podiatrist thus far).    Review of Systems   Constitutional: Negative for chills and fever.   Skin: Negative for rash.        Right hand finger laceration   Neurological: Positive for tingling ( distal to lac). Negative for focal weakness.       Allergies   Allergen Reactions   • Pcn [Penicillins] Anaphylaxis     Per patient        Objective:   /86   Pulse 82   Temp 36.2 °C (97.2 °F) (Tympanic)   Resp 18   Ht 1.854 m (6' 1\")   Wt 111 kg (245 lb)   SpO2 100%   BMI 32.32 kg/m²     Physical Exam  Vitals and nursing note reviewed.   Constitutional:       General: He is not in acute distress.     Appearance: He is well-developed. He is not diaphoretic.   HENT:      Head: Normocephalic and atraumatic.      Right Ear: External ear normal.      Left Ear: External ear normal.      Nose: Nose normal.   Eyes:      General: No scleral icterus.        Right eye: No discharge.         Left eye: No discharge.      Conjunctiva/sclera: Conjunctivae normal.   Pulmonary:      Effort: Pulmonary effort is normal. No respiratory distress.   Musculoskeletal:         General: Normal range of motion.      Right hand: Laceration ( ulnar aspect ring finger) present. No swelling, deformity, tenderness or bony tenderness. Normal range of motion. Normal strength. Normal sensation. There is no disruption of " two-point discrimination. Normal capillary refill. Normal pulse.      Cervical back: Neck supple.      Comments: Full-thickness 1.5 cm laceration wrapping around ulnar aspect of distal phalanx area of fourth digit on right hand, some very superficial injury to ulnar aspect of fingernail that is not through and through   Skin:     General: Skin is warm and dry.      Coloration: Skin is not pale.   Neurological:      Mental Status: He is alert and oriented to person, place, and time.      Coordination: Coordination normal.       Procedure: Laceration Repair  -Risks including bleeding, nerve damage, infection, and poor cosmetic outcome discussed. Benefits and alternatives discussed.   -Clean technique with sterile instruments and suture used  -Local anesthesia with 2% lidocaine  -Closed with #3  4-0 Nylon interrupted sutures with good wound approximation  -Polysporin and dressing placed  -Patient tolerated well      Assessment/Plan:   1. Laceration of right index finger without foreign body without damage to nail, initial encounter  Wound care reviewed   Tdap up-to-date  Return to clinic with lack of resolution or progression of symptoms.      I have worn an N95 mask, gloves and eye protection for the entire encounter with this patient.     Differential diagnosis, natural history, supportive care, and indications for immediate follow-up discussed.

## 2022-06-17 ENCOUNTER — HOSPITAL ENCOUNTER (OUTPATIENT)
Dept: RADIOLOGY | Facility: MEDICAL CENTER | Age: 52
End: 2022-06-17
Attending: NURSE PRACTITIONER
Payer: COMMERCIAL

## 2022-06-17 ENCOUNTER — OFFICE VISIT (OUTPATIENT)
Dept: URGENT CARE | Facility: PHYSICIAN GROUP | Age: 52
End: 2022-06-17
Payer: COMMERCIAL

## 2022-06-17 VITALS
HEART RATE: 83 BPM | OXYGEN SATURATION: 98 % | BODY MASS INDEX: 32.47 KG/M2 | WEIGHT: 245 LBS | RESPIRATION RATE: 18 BRPM | HEIGHT: 73 IN | TEMPERATURE: 97.7 F | SYSTOLIC BLOOD PRESSURE: 130 MMHG | DIASTOLIC BLOOD PRESSURE: 88 MMHG

## 2022-06-17 DIAGNOSIS — S69.91XD INJURY OF FINGER OF RIGHT HAND, SUBSEQUENT ENCOUNTER: ICD-10-CM

## 2022-06-17 DIAGNOSIS — L08.9 LACERATION OF FINGER WITH INFECTION, SUBSEQUENT ENCOUNTER: ICD-10-CM

## 2022-06-17 DIAGNOSIS — S61.219D LACERATION OF FINGER WITH INFECTION, SUBSEQUENT ENCOUNTER: ICD-10-CM

## 2022-06-17 PROCEDURE — 99214 OFFICE O/P EST MOD 30 MIN: CPT | Performed by: NURSE PRACTITIONER

## 2022-06-17 PROCEDURE — 73140 X-RAY EXAM OF FINGER(S): CPT | Mod: RT

## 2022-06-17 RX ORDER — SULFAMETHOXAZOLE AND TRIMETHOPRIM 800; 160 MG/1; MG/1
1 TABLET ORAL 2 TIMES DAILY
Qty: 20 TABLET | Refills: 0 | Status: SHIPPED | OUTPATIENT
Start: 2022-06-17 | End: 2022-09-19

## 2022-06-17 ASSESSMENT — FIBROSIS 4 INDEX: FIB4 SCORE: 1.84

## 2022-06-17 NOTE — PROGRESS NOTES
Right fourth digit, media aspect    Chief Complaint   Patient presents with   • Suture / Staple Removal     Right ring finger 3 sutures       HISTORY OF PRESENT ILLNESS: Patient is a pleasant 52 y.o. male who presents to urgent care today with a wound check.  Patient notes that he cut his right fourth digit with a fan on 2022.  He was seen in urgent care at that time, 3 sutures were placed.  He notes associated swelling, drainage, increased pain over the past 2 to 3 days.  He has been performing basic wound care with Neosporin.  Denies any fever, chills, malaise.  He is right-hand dominant.    Patient Active Problem List    Diagnosis Date Noted   • Mixed dyslipidemia 2020   • MICHAEL (obstructive sleep apnea) 2019   • Former smoker 2019   • Chronic left-sided back pain 2019   • Non-seasonal allergic rhinitis 2019   • Obesity (BMI 30-39.9) 2019       Allergies:Pcn [penicillins]    Current Outpatient Medications Ordered in Epic   Medication Sig Dispense Refill   • cetirizine (ZYRTEC) 10 MG Tab Take 10 mg by mouth every day.     • ibuprofen (MOTRIN) 800 MG Tab Take 1 Tablet by mouth every 8 hours as needed for Moderate Pain or Inflammation. (Patient not taking: Reported on 2022) 30 Tablet 0   • promethazine-dextromethorphan (PROMETHAZINE-DM) 6.25-15 MG/5ML syrup Take 5 mL by mouth every four hours as needed for Cough. (Patient not taking: Reported on 2022) 120 mL 0     No current Epic-ordered facility-administered medications on file.       Past Medical History:   Diagnosis Date   • Allergy    • Asthma    • Bronchitis        Social History     Tobacco Use   • Smoking status: Former Smoker     Packs/day: 1.00     Years: 30.00     Pack years: 30.00     Types: Cigarettes     Quit date:      Years since quittin.4   • Smokeless tobacco: Never Used   Vaping Use   • Vaping Use: Never used   Substance Use Topics   • Alcohol use: No   • Drug use: No       Family Status  "  Relation Name Status   • Mo  Alive   • Fa Don    • Bro  Alive   • Bro  (Not Specified)     Family History   Problem Relation Age of Onset   • No Known Problems Mother    • Diabetes Father    • Heart Disease Father    • No Known Problems Brother    • No Known Problems Brother        ROS:  Review of Systems   Constitutional: Negative for fever, chills, weight loss, malaise, and fatigue.   HENT: Negative for ear pain, nosebleeds, congestion, sore throat and neck pain.    Eyes: Negative for vision changes.   Neuro: Negative for headache, sensory changes, weakness, seizure, LOC.   Cardiovascular: Negative for chest pain, palpitations, orthopnea and leg swelling.   Respiratory: Negative for cough, sputum production, shortness of breath and wheezing.   Gastrointestinal: Negative for abdominal pain, nausea, vomiting or diarrhea.   Genitourinary: Negative for dysuria, urgency and frequency.  Musculoskeletal: Positive for right fourth digit injury.  Negative for falls, neck pain, back pain, joint pain, myalgias.   Skin: Positive for laceration with surrounding erythema, pain, drainage.  Negative for rash, diaphoresis.     Exam:  /88 (BP Location: Left arm, Patient Position: Sitting, BP Cuff Size: Adult)   Pulse 83   Temp 36.5 °C (97.7 °F) (Temporal)   Resp 18   Ht 1.854 m (6' 1\")   Wt 111 kg (245 lb)   SpO2 98%   General: well-nourished, well-developed male in NAD  Head: normocephalic, atraumatic  Eyes: PERRLA, no conjunctival injection, acuity grossly intact, lids normal.  Ears: normal shape and symmetry, no tenderness, no discharge. External canals are without any significant edema or erythema. Tympanic membranes are without any inflammation, no effusion. Gross auditory acuity is intact.  Nose: symmetrical without tenderness, no discharge.  Mouth/Throat: reasonable hygiene, no erythema, exudates or tonsillar enlargement.  Neck: no masses, range of motion within normal limits, no tracheal deviation. No " "obvious thyroid enlargement.   Lymph: no cervical adenopathy. No supraclavicular adenopathy.   Neuro: alert and oriented. Cranial nerves 1-12 grossly intact. No sensory deficit.   Cardiovascular: regular rate and rhythm. No edema.  Pulmonary: no distress. Chest is symmetrical with respiration, no wheezes, crackles, or rhonchi.   Musculoskeletal: no clubbing, appropriate muscle tone, gait is stable.  Right fourth digit: There is a macerated laceration noted to distal aspect of finger, medial aspect, 3 sutures in place, wound is wet with surrounding erythema.  No lymphangitis.  Distal neurovascular is intact.  Skin: warm, no clubbing, no cyanosis, no rashes.   Psych: appropriate mood, affect, judgement.       DX right finger radiology reading \"No bony abnormality identified there is a soft tissue defect along the ulnar aspect adjacent to the distal phalanx of the right ring finger.\"      Assessment/Plan:  1. Laceration of finger with infection, subsequent encounter  DX-FINGER(S) 2+ RIGHT    sulfamethoxazole-trimethoprim (BACTRIM DS) 800-160 MG tablet         Patient presents for wound follow-up.  Notes a laceration to finger on 6/8/2022.  At that time 3 sutures were placed.  Today, the sutures were removed without difficulty.  There is signs of infectious process present.  X-ray performed today which is negative for any bony abnormalities.  We will treat with Bactrim, wound care discussed.  If no improvement in the next 48 to 72 hours, instructed return for reevaluation.  Supportive care, differential diagnoses, and indications for immediate follow-up discussed with patient.   Pathogenesis of diagnosis discussed including typical length and natural progression.   Instructed to return to clinic or nearest emergency department for any change in condition, further concerns, or worsening of symptoms.  Patient states understanding of the plan of care and discharge instructions.  Instructed to make an appointment, for " follow up, with his primary care provider.        Please note that this dictation was created using voice recognition software. I have made every reasonable attempt to correct obvious errors, but I expect that there are errors of grammar and possibly content that I did not discover before finalizing the note. Previous clinic visit encounter reviewed and considered in medical decision making today.         JUSTIN Ruvalcaba.

## 2022-09-19 ENCOUNTER — OFFICE VISIT (OUTPATIENT)
Dept: MEDICAL GROUP | Facility: LAB | Age: 52
End: 2022-09-19
Payer: COMMERCIAL

## 2022-09-19 VITALS
HEIGHT: 73 IN | WEIGHT: 253 LBS | RESPIRATION RATE: 14 BRPM | DIASTOLIC BLOOD PRESSURE: 60 MMHG | BODY MASS INDEX: 33.53 KG/M2 | SYSTOLIC BLOOD PRESSURE: 114 MMHG | HEART RATE: 97 BPM | OXYGEN SATURATION: 97 % | TEMPERATURE: 97.6 F

## 2022-09-19 DIAGNOSIS — Z12.11 SCREENING FOR COLORECTAL CANCER: ICD-10-CM

## 2022-09-19 DIAGNOSIS — Z13.6 SCREENING FOR CARDIOVASCULAR CONDITION: ICD-10-CM

## 2022-09-19 DIAGNOSIS — Z00.00 ANNUAL PHYSICAL EXAM: ICD-10-CM

## 2022-09-19 DIAGNOSIS — Z12.12 SCREENING FOR COLORECTAL CANCER: ICD-10-CM

## 2022-09-19 PROCEDURE — 99396 PREV VISIT EST AGE 40-64: CPT | Performed by: FAMILY MEDICINE

## 2022-09-19 SDOH — ECONOMIC STABILITY: INCOME INSECURITY: HOW HARD IS IT FOR YOU TO PAY FOR THE VERY BASICS LIKE FOOD, HOUSING, MEDICAL CARE, AND HEATING?: NOT VERY HARD

## 2022-09-19 SDOH — ECONOMIC STABILITY: FOOD INSECURITY: WITHIN THE PAST 12 MONTHS, THE FOOD YOU BOUGHT JUST DIDN'T LAST AND YOU DIDN'T HAVE MONEY TO GET MORE.: PATIENT DECLINED

## 2022-09-19 SDOH — ECONOMIC STABILITY: INCOME INSECURITY: IN THE LAST 12 MONTHS, WAS THERE A TIME WHEN YOU WERE NOT ABLE TO PAY THE MORTGAGE OR RENT ON TIME?: NO

## 2022-09-19 SDOH — ECONOMIC STABILITY: HOUSING INSECURITY
IN THE LAST 12 MONTHS, WAS THERE A TIME WHEN YOU DID NOT HAVE A STEADY PLACE TO SLEEP OR SLEPT IN A SHELTER (INCLUDING NOW)?: PATIENT REFUSED

## 2022-09-19 SDOH — HEALTH STABILITY: PHYSICAL HEALTH: ON AVERAGE, HOW MANY MINUTES DO YOU ENGAGE IN EXERCISE AT THIS LEVEL?: PATIENT DECLINED

## 2022-09-19 SDOH — HEALTH STABILITY: MENTAL HEALTH
STRESS IS WHEN SOMEONE FEELS TENSE, NERVOUS, ANXIOUS, OR CAN'T SLEEP AT NIGHT BECAUSE THEIR MIND IS TROUBLED. HOW STRESSED ARE YOU?: NOT AT ALL

## 2022-09-19 SDOH — ECONOMIC STABILITY: TRANSPORTATION INSECURITY
IN THE PAST 12 MONTHS, HAS THE LACK OF TRANSPORTATION KEPT YOU FROM MEDICAL APPOINTMENTS OR FROM GETTING MEDICATIONS?: NO

## 2022-09-19 SDOH — HEALTH STABILITY: PHYSICAL HEALTH
ON AVERAGE, HOW MANY DAYS PER WEEK DO YOU ENGAGE IN MODERATE TO STRENUOUS EXERCISE (LIKE A BRISK WALK)?: PATIENT DECLINED

## 2022-09-19 SDOH — ECONOMIC STABILITY: FOOD INSECURITY: WITHIN THE PAST 12 MONTHS, YOU WORRIED THAT YOUR FOOD WOULD RUN OUT BEFORE YOU GOT MONEY TO BUY MORE.: PATIENT DECLINED

## 2022-09-19 SDOH — ECONOMIC STABILITY: HOUSING INSECURITY

## 2022-09-19 SDOH — ECONOMIC STABILITY: TRANSPORTATION INSECURITY
IN THE PAST 12 MONTHS, HAS LACK OF TRANSPORTATION KEPT YOU FROM MEETINGS, WORK, OR FROM GETTING THINGS NEEDED FOR DAILY LIVING?: NO

## 2022-09-19 SDOH — ECONOMIC STABILITY: TRANSPORTATION INSECURITY
IN THE PAST 12 MONTHS, HAS LACK OF RELIABLE TRANSPORTATION KEPT YOU FROM MEDICAL APPOINTMENTS, MEETINGS, WORK OR FROM GETTING THINGS NEEDED FOR DAILY LIVING?: NO

## 2022-09-19 ASSESSMENT — SOCIAL DETERMINANTS OF HEALTH (SDOH)
HOW OFTEN DO YOU ATTEND CHURCH OR RELIGIOUS SERVICES?: PATIENT DECLINED
HOW OFTEN DO YOU HAVE A DRINK CONTAINING ALCOHOL: NEVER
DO YOU BELONG TO ANY CLUBS OR ORGANIZATIONS SUCH AS CHURCH GROUPS UNIONS, FRATERNAL OR ATHLETIC GROUPS, OR SCHOOL GROUPS?: NO
WITHIN THE PAST 12 MONTHS, YOU WORRIED THAT YOUR FOOD WOULD RUN OUT BEFORE YOU GOT THE MONEY TO BUY MORE: PATIENT DECLINED
HOW OFTEN DO YOU ATTENT MEETINGS OF THE CLUB OR ORGANIZATION YOU BELONG TO?: NEVER
HOW OFTEN DO YOU ATTENT MEETINGS OF THE CLUB OR ORGANIZATION YOU BELONG TO?: NEVER
HOW OFTEN DO YOU GET TOGETHER WITH FRIENDS OR RELATIVES?: PATIENT DECLINED
HOW HARD IS IT FOR YOU TO PAY FOR THE VERY BASICS LIKE FOOD, HOUSING, MEDICAL CARE, AND HEATING?: NOT VERY HARD
IN A TYPICAL WEEK, HOW MANY TIMES DO YOU TALK ON THE PHONE WITH FAMILY, FRIENDS, OR NEIGHBORS?: THREE TIMES A WEEK
HOW OFTEN DO YOU GET TOGETHER WITH FRIENDS OR RELATIVES?: PATIENT DECLINED
HOW OFTEN DO YOU HAVE SIX OR MORE DRINKS ON ONE OCCASION: NEVER
IN A TYPICAL WEEK, HOW MANY TIMES DO YOU TALK ON THE PHONE WITH FAMILY, FRIENDS, OR NEIGHBORS?: THREE TIMES A WEEK
HOW MANY DRINKS CONTAINING ALCOHOL DO YOU HAVE ON A TYPICAL DAY WHEN YOU ARE DRINKING: PATIENT DOES NOT DRINK
DO YOU BELONG TO ANY CLUBS OR ORGANIZATIONS SUCH AS CHURCH GROUPS UNIONS, FRATERNAL OR ATHLETIC GROUPS, OR SCHOOL GROUPS?: NO
HOW OFTEN DO YOU ATTEND CHURCH OR RELIGIOUS SERVICES?: PATIENT DECLINED

## 2022-09-19 ASSESSMENT — LIFESTYLE VARIABLES
SKIP TO QUESTIONS 9-10: 1
HOW OFTEN DO YOU HAVE SIX OR MORE DRINKS ON ONE OCCASION: NEVER
AUDIT-C TOTAL SCORE: 0
HOW OFTEN DO YOU HAVE A DRINK CONTAINING ALCOHOL: NEVER
HOW MANY STANDARD DRINKS CONTAINING ALCOHOL DO YOU HAVE ON A TYPICAL DAY: PATIENT DOES NOT DRINK

## 2022-09-19 ASSESSMENT — FIBROSIS 4 INDEX: FIB4 SCORE: 1.84

## 2022-09-19 ASSESSMENT — ENCOUNTER SYMPTOMS
SHORTNESS OF BREATH: 0
CONSTIPATION: 0
ABDOMINAL PAIN: 0
CHILLS: 0
DEPRESSION: 0
VOMITING: 0
WHEEZING: 0
BLURRED VISION: 0
FEVER: 0
NAUSEA: 0
DIARRHEA: 0
PALPITATIONS: 0
NERVOUS/ANXIOUS: 0

## 2022-09-19 ASSESSMENT — PATIENT HEALTH QUESTIONNAIRE - PHQ9: CLINICAL INTERPRETATION OF PHQ2 SCORE: 0

## 2022-09-19 NOTE — PROGRESS NOTES
Subjective:   Toro Graves is a 52 y.o. male here today for   Chief Complaint   Patient presents with    Annual Exam     #annual   -Reviewed all past medical history, family history, social history.  -Reviewed all screening/vaccinations: due for labs including lipid profile. Due for cologuard.   -Diet and Exercise: no real diet or exercise regimen, working on improving   -Tobacco, alcohol, recreational drug use: Denies any tobacco, alcohol, recreational drug use  -Establishing with new dental home.   -Sexually active: Diagnosed with female partner, no concerns.      Allergies   Allergen Reactions    Pcn [Penicillins] Anaphylaxis     Per patient         Current medicines (including changes today)  Current Outpatient Medications   Medication Sig Dispense Refill    cetirizine (ZYRTEC) 10 MG Tab Take 10 mg by mouth every day.       No current facility-administered medications for this visit.     He  has a past medical history of Allergy, Asthma, and Bronchitis.    Social History     Socioeconomic History    Marital status:      Spouse name: Not on file    Number of children: Not on file    Years of education: Not on file    Highest education level: Not on file   Occupational History    Not on file   Tobacco Use    Smoking status: Former     Packs/day: 1.00     Years: 30.00     Pack years: 30.00     Types: Cigarettes     Quit date: 2009     Years since quittin.7    Smokeless tobacco: Never   Vaping Use    Vaping Use: Never used   Substance and Sexual Activity    Alcohol use: No    Drug use: No    Sexual activity: Yes     Partners: Female   Other Topics Concern    Not on file   Social History Narrative    Not on file     Social Determinants of Health     Financial Resource Strain: Low Risk     Difficulty of Paying Living Expenses: Not very hard   Food Insecurity: Unknown    Worried About Running Out of Food in the Last Year: Patient refused    Ran Out of Food in the Last Year: Patient refused  "  Transportation Needs: No Transportation Needs    Lack of Transportation (Medical): No    Lack of Transportation (Non-Medical): No   Physical Activity: Unknown    Days of Exercise per Week: Patient refused    Minutes of Exercise per Session: Patient refused   Stress: No Stress Concern Present    Feeling of Stress : Not at all   Social Connections: Unknown    Frequency of Communication with Friends and Family: Three times a week    Frequency of Social Gatherings with Friends and Family: Patient refused    Attends Temple Services: Patient refused    Active Member of Clubs or Organizations: No    Attends Club or Organization Meetings: Never    Marital Status:    Intimate Partner Violence: Not on file   Housing Stability: Unknown    Unable to Pay for Housing in the Last Year: No    Number of Places Lived in the Last Year: Not on file    Unstable Housing in the Last Year: Patient refused       ROS   Review of Systems   Constitutional:  Negative for chills and fever.   HENT:  Negative for hearing loss.    Eyes:  Negative for blurred vision.   Respiratory:  Negative for shortness of breath and wheezing.    Cardiovascular:  Negative for chest pain and palpitations.   Gastrointestinal:  Negative for abdominal pain, constipation, diarrhea, nausea and vomiting.   Psychiatric/Behavioral:  Negative for depression. The patient is not nervous/anxious.         Objective:     Physical Exam:  /60   Pulse 97   Temp 36.4 °C (97.6 °F) (Temporal)   Resp 14   Ht 1.854 m (6' 0.99\")   Wt 115 kg (253 lb)   SpO2 97%  Body mass index is 33.39 kg/m².   Constitutional: Alert, no distress.  Skin: Warm, dry, good turgor, no rashes in visible areas.  Eye: Equal, round and reactive, conjunctiva clear, lids normal.  ENMT: TM's clear bilaterally, lips without lesions, good dentition, oropharynx clear.  Neck: Trachea midline, no masses, no thyromegaly. No cervical or supraclavicular lymphadenopathy.  Respiratory: Unlabored " respiratory effort, lungs clear to auscultation, no wheezes, no rhonchi.  Cardiovascular: Normal S1, S2, no murmur, no edema.  Abdomen: Soft, non-tender, no masses, no hepatosplenomegaly.  Psych: Alert and oriented x3, normal affect and mood.    Assessment and Plan:     1. Annual physical exam  -All questions concerns were answered at this time.  -Vaccinations/screenings needed at this time: Patient due for COVID-19 booster, up-to-date on other vaccines.  Screenings needed including colorectal cancer screening, will order Cologuard today.  -Labs reviewed, no concerns, will recheck labs including lipid profile.  -Discussed the importance of a healthy, Mediterranean style diet, routine exercise regimen.  -Discussed general safety measures including seatbelts, helmets, avoidance of smoking, sunscreen, hydration.  -Follow-up for general physical exam on a yearly basis, sooner if needed.  - CBC WITHOUT DIFFERENTIAL; Future  - Comp Metabolic Panel; Future    2. Screening for cardiovascular condition  - Lipid Profile; Future    3. Screening for colorectal cancer  - COLOGUARD (FIT DNA)      Followup: No follow-ups on file.         PLEASE NOTE: This dictation was created using voice recognition software. I have made every reasonable attempt to correct obvious errors, but I expect that there are errors of grammar and possibly content that I did not discover before finalizing the note.

## 2022-09-20 ENCOUNTER — HOSPITAL ENCOUNTER (OUTPATIENT)
Dept: LAB | Facility: MEDICAL CENTER | Age: 52
End: 2022-09-20
Attending: FAMILY MEDICINE
Payer: COMMERCIAL

## 2022-09-20 DIAGNOSIS — Z13.6 SCREENING FOR CARDIOVASCULAR CONDITION: ICD-10-CM

## 2022-09-20 DIAGNOSIS — Z00.00 ANNUAL PHYSICAL EXAM: ICD-10-CM

## 2022-09-20 LAB
ALBUMIN SERPL BCP-MCNC: 4.6 G/DL (ref 3.2–4.9)
ALBUMIN/GLOB SERPL: 1.9 G/DL
ALP SERPL-CCNC: 102 U/L (ref 30–99)
ALT SERPL-CCNC: 30 U/L (ref 2–50)
ANION GAP SERPL CALC-SCNC: 10 MMOL/L (ref 7–16)
AST SERPL-CCNC: 17 U/L (ref 12–45)
BILIRUB SERPL-MCNC: 0.3 MG/DL (ref 0.1–1.5)
BUN SERPL-MCNC: 12 MG/DL (ref 8–22)
CALCIUM SERPL-MCNC: 9.3 MG/DL (ref 8.5–10.5)
CHLORIDE SERPL-SCNC: 103 MMOL/L (ref 96–112)
CHOLEST SERPL-MCNC: 189 MG/DL (ref 100–199)
CO2 SERPL-SCNC: 24 MMOL/L (ref 20–33)
CREAT SERPL-MCNC: 0.89 MG/DL (ref 0.5–1.4)
ERYTHROCYTE [DISTWIDTH] IN BLOOD BY AUTOMATED COUNT: 35.2 FL (ref 35.9–50)
GFR SERPLBLD CREATININE-BSD FMLA CKD-EPI: 102 ML/MIN/1.73 M 2
GLOBULIN SER CALC-MCNC: 2.4 G/DL (ref 1.9–3.5)
GLUCOSE SERPL-MCNC: 114 MG/DL (ref 65–99)
HCT VFR BLD AUTO: 42.9 % (ref 42–52)
HDLC SERPL-MCNC: 28 MG/DL
HGB BLD-MCNC: 15.6 G/DL (ref 14–18)
LDLC SERPL CALC-MCNC: 100 MG/DL
MCH RBC QN AUTO: 29.8 PG (ref 27–33)
MCHC RBC AUTO-ENTMCNC: 36.4 G/DL (ref 33.7–35.3)
MCV RBC AUTO: 82 FL (ref 81.4–97.8)
PLATELET # BLD AUTO: 281 K/UL (ref 164–446)
PMV BLD AUTO: 9.5 FL (ref 9–12.9)
POTASSIUM SERPL-SCNC: 4 MMOL/L (ref 3.6–5.5)
PROT SERPL-MCNC: 7 G/DL (ref 6–8.2)
RBC # BLD AUTO: 5.23 M/UL (ref 4.7–6.1)
SODIUM SERPL-SCNC: 137 MMOL/L (ref 135–145)
TRIGL SERPL-MCNC: 303 MG/DL (ref 0–149)
WBC # BLD AUTO: 9.3 K/UL (ref 4.8–10.8)

## 2022-09-20 PROCEDURE — 36415 COLL VENOUS BLD VENIPUNCTURE: CPT

## 2022-09-20 PROCEDURE — 80053 COMPREHEN METABOLIC PANEL: CPT

## 2022-09-20 PROCEDURE — 85027 COMPLETE CBC AUTOMATED: CPT

## 2022-09-20 PROCEDURE — 80061 LIPID PANEL: CPT

## 2023-02-28 ENCOUNTER — OFFICE VISIT (OUTPATIENT)
Dept: URGENT CARE | Facility: PHYSICIAN GROUP | Age: 53
End: 2023-02-28
Payer: COMMERCIAL

## 2023-02-28 VITALS
BODY MASS INDEX: 33.53 KG/M2 | HEART RATE: 100 BPM | WEIGHT: 253 LBS | TEMPERATURE: 99.3 F | DIASTOLIC BLOOD PRESSURE: 92 MMHG | OXYGEN SATURATION: 96 % | RESPIRATION RATE: 14 BRPM | SYSTOLIC BLOOD PRESSURE: 118 MMHG | HEIGHT: 73 IN

## 2023-02-28 DIAGNOSIS — J02.9 PHARYNGITIS, UNSPECIFIED ETIOLOGY: ICD-10-CM

## 2023-02-28 DIAGNOSIS — R05.9 COUGH IN ADULT PATIENT: ICD-10-CM

## 2023-02-28 DIAGNOSIS — R68.89 FLU-LIKE SYMPTOMS: ICD-10-CM

## 2023-02-28 DIAGNOSIS — U07.1 COVID-19 VIRUS INFECTION: Primary | ICD-10-CM

## 2023-02-28 LAB
FLUAV RNA SPEC QL NAA+PROBE: NEGATIVE
FLUBV RNA SPEC QL NAA+PROBE: NEGATIVE
RSV RNA SPEC QL NAA+PROBE: NEGATIVE
S PYO DNA SPEC NAA+PROBE: NOT DETECTED
SARS-COV-2 RNA RESP QL NAA+PROBE: POSITIVE

## 2023-02-28 PROCEDURE — 87651 STREP A DNA AMP PROBE: CPT | Performed by: NURSE PRACTITIONER

## 2023-02-28 PROCEDURE — 0241U POCT CEPHEID COV-2, FLU A/B, RSV - PCR: CPT | Performed by: NURSE PRACTITIONER

## 2023-02-28 PROCEDURE — 99213 OFFICE O/P EST LOW 20 MIN: CPT | Performed by: NURSE PRACTITIONER

## 2023-02-28 RX ORDER — DEXTROMETHORPHAN HYDROBROMIDE AND PROMETHAZINE HYDROCHLORIDE 15; 6.25 MG/5ML; MG/5ML
5 SYRUP ORAL EVERY 4 HOURS PRN
Qty: 120 ML | Refills: 0 | Status: SHIPPED | OUTPATIENT
Start: 2023-02-28 | End: 2023-03-07

## 2023-02-28 RX ORDER — DEXAMETHASONE SODIUM PHOSPHATE 10 MG/ML
10 INJECTION INTRAMUSCULAR; INTRAVENOUS ONCE
Status: COMPLETED | OUTPATIENT
Start: 2023-02-28 | End: 2023-02-28

## 2023-02-28 RX ADMIN — DEXAMETHASONE SODIUM PHOSPHATE 10 MG: 10 INJECTION INTRAMUSCULAR; INTRAVENOUS at 13:44

## 2023-02-28 ASSESSMENT — ENCOUNTER SYMPTOMS
FATIGUE: 1
FEVER: 0
CHANGE IN BOWEL HABIT: 1
COUGH: 1
WEAKNESS: 1
HEADACHES: 1
NAUSEA: 0
VOMITING: 0
SORE THROAT: 1

## 2023-02-28 ASSESSMENT — FIBROSIS 4 INDEX: FIB4 SCORE: 0.59

## 2023-02-28 NOTE — PROGRESS NOTES
Subjective:     Toro Graves is a 52 y.o. male who presents for Headache (Sore throat, runny nose, bipin aches, fatigue x 2 days )      Influenza  This is a new problem. The current episode started in the past 7 days (2 days of body aches, sore throat, mild cough and headache). Associated symptoms include a change in bowel habit, congestion, coughing, fatigue, headaches, a sore throat and weakness. Pertinent negatives include no fever, nausea or vomiting. He has tried rest (Throat lozenges, Nyquil) for the symptoms. The treatment provided mild relief.       Review of Systems   Constitutional:  Positive for fatigue and malaise/fatigue. Negative for fever.   HENT:  Positive for congestion and sore throat.    Respiratory:  Positive for cough.    Gastrointestinal:  Positive for change in bowel habit. Negative for nausea and vomiting.   Neurological:  Positive for weakness and headaches.     PMH:   Past Medical History:   Diagnosis Date    Allergy     Asthma     Bronchitis      ALLERGIES:   Allergies   Allergen Reactions    Pcn [Penicillins] Anaphylaxis     Per patient     SURGHX: No past surgical history on file.  SOCHX:   Social History     Socioeconomic History    Marital status:    Tobacco Use    Smoking status: Former     Packs/day: 1.00     Years: 30.00     Pack years: 30.00     Types: Cigarettes     Quit date: 2009     Years since quittin.1    Smokeless tobacco: Never   Vaping Use    Vaping Use: Never used   Substance and Sexual Activity    Alcohol use: No    Drug use: No    Sexual activity: Yes     Partners: Female     Social Determinants of Health     Financial Resource Strain: Low Risk     Difficulty of Paying Living Expenses: Not very hard   Food Insecurity: Unknown    Worried About Running Out of Food in the Last Year: Patient refused    Ran Out of Food in the Last Year: Patient refused   Transportation Needs: No Transportation Needs    Lack of Transportation (Medical): No    Lack of  "Transportation (Non-Medical): No   Physical Activity: Unknown    Days of Exercise per Week: Patient refused    Minutes of Exercise per Session: Patient refused   Stress: No Stress Concern Present    Feeling of Stress : Not at all   Social Connections: Unknown    Frequency of Communication with Friends and Family: Three times a week    Frequency of Social Gatherings with Friends and Family: Patient refused    Attends Mosque Services: Patient refused    Active Member of Clubs or Organizations: No    Attends Club or Organization Meetings: Never    Marital Status:    Housing Stability: Unknown    Unable to Pay for Housing in the Last Year: No    Unstable Housing in the Last Year: Patient refused     FH:   Family History   Problem Relation Age of Onset    No Known Problems Mother     Diabetes Father     Heart Disease Father     No Known Problems Brother     No Known Problems Brother          Objective:   BP (!) 118/92   Pulse 100   Temp 37.4 °C (99.3 °F) (Temporal)   Resp 14   Ht 1.854 m (6' 1\")   Wt 115 kg (253 lb)   SpO2 96%   BMI 33.38 kg/m²     Physical Exam  Constitutional:       General: He is not in acute distress.     Appearance: He is normal weight. He is ill-appearing.   HENT:      Head: Normocephalic and atraumatic.      Right Ear: Tympanic membrane, ear canal and external ear normal. There is no impacted cerumen.      Left Ear: Tympanic membrane, ear canal and external ear normal. There is no impacted cerumen.      Nose: Congestion and rhinorrhea present.      Mouth/Throat:      Mouth: Mucous membranes are moist.      Pharynx: Posterior oropharyngeal erythema present. No oropharyngeal exudate.   Eyes:      General:         Right eye: No discharge.         Left eye: No discharge.      Extraocular Movements: Extraocular movements intact.      Conjunctiva/sclera: Conjunctivae normal.      Pupils: Pupils are equal, round, and reactive to light.   Cardiovascular:      Rate and Rhythm: Tachycardia " present.      Heart sounds: Normal heart sounds. No murmur heard.  Pulmonary:      Effort: Pulmonary effort is normal. No respiratory distress.      Breath sounds: Normal breath sounds. No stridor. No wheezing, rhonchi or rales.   Chest:      Chest wall: No tenderness.   Abdominal:      General: Abdomen is flat. There is no distension.      Palpations: Abdomen is soft.   Musculoskeletal:         General: Normal range of motion.      Cervical back: Normal range of motion. No tenderness. Muscular tenderness present.   Lymphadenopathy:      Cervical: Cervical adenopathy present.   Skin:     General: Skin is warm and dry.      Capillary Refill: Capillary refill takes less than 2 seconds.   Neurological:      General: No focal deficit present.      Mental Status: He is alert and oriented to person, place, and time. Mental status is at baseline.   Psychiatric:         Mood and Affect: Mood normal.         Behavior: Behavior normal.         Thought Content: Thought content normal.         Judgment: Judgment normal.       Assessment/Plan:   Assessment    1. COVID-19 virus infection  Nirmatrelvir&Ritonavir 300/100 20 x 150 MG & 10 x 100MG Tablet Therapy Pack      2. Flu-like symptoms  POCT CEPHEID COV-2, FLU A/B, RSV - PCR      3. Pharyngitis, unspecified etiology  POCT CEPHEID GROUP A STREP - PCR    dexamethasone (DECADRON) injection (check route below) 10 mg      4. Cough in adult patient  promethazine-dextromethorphan (PROMETHAZINE-DM) 6.25-15 MG/5ML syrup      Toro did test positive for COVID in the clinic today.  He is within the timeframe for antiviral treatment.  He was educated that this was emergently approved by the FDA and long-term side effects are unknown at this time.  He does not take any medication on a daily basis so drug interaction was not performed.  Short-term side effects were discussed.  FDA facts sheet sent via [facility] for patient information on Paxlovid.   We discussed supportive measures  including humidifier, warm salt water gargles, over-the-counter Cepacol throat lozenges, rest  and increased fluids. Pt was encouraged to seek treatment back in the ER or urgent care for worsening symptoms,  fever greater than 100.5, wheezes or shortness of breath.

## 2023-02-28 NOTE — LETTER
February 28, 2023    To Whom It May Concern:         This is confirmation that Toro Graves attended his scheduled appointment with CHERELLE Leslie on 2/28/23. Please excuse his absence due to an acute illness. He may return to work on 3/5/2023 or sooner if better.          If you have any questions please do not hesitate to call me at the phone number listed below.    Sincerely,          JUSTIN Leslie.  [phone]

## 2023-03-08 ENCOUNTER — OFFICE VISIT (OUTPATIENT)
Dept: URGENT CARE | Facility: PHYSICIAN GROUP | Age: 53
End: 2023-03-08
Payer: COMMERCIAL

## 2023-03-08 VITALS
DIASTOLIC BLOOD PRESSURE: 88 MMHG | BODY MASS INDEX: 33.53 KG/M2 | WEIGHT: 253 LBS | HEART RATE: 96 BPM | HEIGHT: 73 IN | TEMPERATURE: 98.6 F | SYSTOLIC BLOOD PRESSURE: 130 MMHG | OXYGEN SATURATION: 97 % | RESPIRATION RATE: 14 BRPM

## 2023-03-08 DIAGNOSIS — J01.90 ACUTE NON-RECURRENT SINUSITIS, UNSPECIFIED LOCATION: ICD-10-CM

## 2023-03-08 DIAGNOSIS — R05.1 ACUTE COUGH: ICD-10-CM

## 2023-03-08 DIAGNOSIS — Z86.16 HISTORY OF COVID-19: ICD-10-CM

## 2023-03-08 DIAGNOSIS — R06.2 WHEEZING: ICD-10-CM

## 2023-03-08 PROCEDURE — 99213 OFFICE O/P EST LOW 20 MIN: CPT | Performed by: NURSE PRACTITIONER

## 2023-03-08 RX ORDER — ALBUTEROL SULFATE 90 UG/1
1-2 AEROSOL, METERED RESPIRATORY (INHALATION) EVERY 4 HOURS PRN
Qty: 8 G | Refills: 0 | Status: SHIPPED | OUTPATIENT
Start: 2023-03-08 | End: 2023-12-11

## 2023-03-08 RX ORDER — DOXYCYCLINE HYCLATE 100 MG
100 TABLET ORAL EVERY 12 HOURS
Qty: 14 TABLET | Refills: 0 | Status: SHIPPED | OUTPATIENT
Start: 2023-03-08 | End: 2023-03-15

## 2023-03-08 RX ORDER — BENZONATATE 200 MG/1
200 CAPSULE ORAL EVERY 8 HOURS PRN
Qty: 30 CAPSULE | Refills: 0 | Status: SHIPPED | OUTPATIENT
Start: 2023-03-08 | End: 2023-12-11

## 2023-03-08 ASSESSMENT — VISUAL ACUITY: OU: 1

## 2023-03-08 ASSESSMENT — ENCOUNTER SYMPTOMS
COUGH: 1
SINUS PAIN: 1
SINUS PRESSURE: 1
SPUTUM PRODUCTION: 1

## 2023-03-08 ASSESSMENT — FIBROSIS 4 INDEX: FIB4 SCORE: 0.6

## 2023-03-08 NOTE — PROGRESS NOTES
Subjective:     Toro Graves is a 53 y.o. male who presents for Sinus Problem (Sinus pressure, cough, mucus, SOB)       Cough  This is a new problem. The problem has been gradually worsening. The cough is Productive of sputum (Green). Associated symptoms include ear pain (L > R).   Sinusitis  This is a new problem. The problem has been gradually worsening since onset. Associated symptoms include congestion, coughing, ear pain (L > R) and sinus pressure.     Seen in urgent care 2/28/2023.  Had symptoms of fatigue, congestion, and cough.  Also had a sore throat.  Tested positive for COVID.  He was started on Paxlovid which he finished.  Felt a little better.  However, starting to experience worsening sinus pressure and green discharge.  Also reports he is coughing up green phlegm.  Feels short of breath.  Continues have a bad cough.  Using his neck.    Review of Systems   Constitutional:  Positive for malaise/fatigue.   HENT:  Positive for congestion, ear pain (L > R), sinus pressure and sinus pain.    Respiratory:  Positive for cough and sputum production.    All other systems reviewed and are negative.    Refer to HPI for additional details.    During this visit, appropriate PPE was worn, hand hygiene was performed, and the patient and any visitors were masked.    PMH:  has a past medical history of Allergy, Asthma, and Bronchitis.    MEDS:   Current Outpatient Medications:     doxycycline (VIBRAMYCIN) 100 MG Tab, Take 1 Tablet by mouth every 12 hours for 7 days., Disp: 14 Tablet, Rfl: 0    albuterol 108 (90 Base) MCG/ACT Aero Soln inhalation aerosol, Inhale 1-2 Puffs every four hours as needed for Shortness of Breath (and/or wheezing)., Disp: 8 g, Rfl: 0    benzonatate (TESSALON) 200 MG capsule, Take 1 Capsule by mouth every 8 hours as needed for Cough., Disp: 30 Capsule, Rfl: 0    cetirizine (ZYRTEC) 10 MG Tab, Take 10 mg by mouth every day., Disp: , Rfl:     ALLERGIES:   Allergies   Allergen Reactions    Pcn  "[Penicillins] Anaphylaxis     Per patient     SURGHX: History reviewed. No pertinent surgical history.  SOCHX:  reports that he quit smoking about 14 years ago. His smoking use included cigarettes. He has a 30.00 pack-year smoking history. He has never used smokeless tobacco. He reports that he does not drink alcohol and does not use drugs.    FH: Per HPI as applicable/pertinent.      Objective:     /88   Pulse 96   Temp 37 °C (98.6 °F) (Temporal)   Resp 14   Ht 1.854 m (6' 1\")   Wt 115 kg (253 lb)   SpO2 97%   BMI 33.38 kg/m²     Physical Exam  Nursing note reviewed.   Constitutional:       General: He is not in acute distress.     Appearance: He is well-developed. He is not ill-appearing or toxic-appearing.   HENT:      Head: Normocephalic.      Right Ear: Tympanic membrane normal.      Left Ear: Tympanic membrane normal.      Nose: Congestion present.      Right Sinus: Maxillary sinus tenderness and frontal sinus tenderness present.      Left Sinus: Maxillary sinus tenderness and frontal sinus tenderness present.      Mouth/Throat:      Mouth: Mucous membranes are moist.      Pharynx: Oropharynx is clear.   Eyes:      General: Vision grossly intact.      Extraocular Movements: Extraocular movements intact.      Conjunctiva/sclera: Conjunctivae normal.   Neck:      Trachea: Phonation normal.   Cardiovascular:      Rate and Rhythm: Normal rate and regular rhythm.      Heart sounds: Normal heart sounds.   Pulmonary:      Effort: Pulmonary effort is normal. No respiratory distress.      Breath sounds: Wheezing and rhonchi present. No decreased breath sounds.   Musculoskeletal:         General: No deformity. Normal range of motion.      Cervical back: Normal range of motion.   Skin:     General: Skin is warm and dry.      Coloration: Skin is not pale.   Neurological:      Mental Status: He is alert and oriented to person, place, and time.      Motor: No weakness.   Psychiatric:         Behavior: Behavior " normal. Behavior is cooperative.       Assessment/Plan:     1. History of COVID-19    2. Acute cough  - benzonatate (TESSALON) 200 MG capsule; Take 1 Capsule by mouth every 8 hours as needed for Cough.  Dispense: 30 Capsule; Refill: 0    3. Wheezing  - albuterol 108 (90 Base) MCG/ACT Aero Soln inhalation aerosol; Inhale 1-2 Puffs every four hours as needed for Shortness of Breath (and/or wheezing).  Dispense: 8 g; Refill: 0    4. Acute non-recurrent sinusitis, unspecified location  - doxycycline (VIBRAMYCIN) 100 MG Tab; Take 1 Tablet by mouth every 12 hours for 7 days.  Dispense: 14 Tablet; Refill: 0    Rx as above sent electronically. OTC Mucinex.    Differential diagnosis, natural history, supportive care, over-the-counter symptom management per 's instructions, close monitoring, and indications for immediate follow-up discussed.     All questions answered. Patient agrees with the plan of care.

## 2023-11-12 ENCOUNTER — HOSPITAL ENCOUNTER (OUTPATIENT)
Dept: RADIOLOGY | Facility: MEDICAL CENTER | Age: 53
End: 2023-11-12
Payer: COMMERCIAL

## 2023-11-12 ENCOUNTER — OFFICE VISIT (OUTPATIENT)
Dept: URGENT CARE | Facility: PHYSICIAN GROUP | Age: 53
End: 2023-11-12
Payer: COMMERCIAL

## 2023-11-12 VITALS
SYSTOLIC BLOOD PRESSURE: 130 MMHG | HEART RATE: 82 BPM | WEIGHT: 250 LBS | TEMPERATURE: 97.8 F | RESPIRATION RATE: 18 BRPM | OXYGEN SATURATION: 96 % | HEIGHT: 73 IN | DIASTOLIC BLOOD PRESSURE: 72 MMHG | BODY MASS INDEX: 33.13 KG/M2

## 2023-11-12 DIAGNOSIS — M25.561 RIGHT MEDIAL KNEE PAIN: ICD-10-CM

## 2023-11-12 DIAGNOSIS — M25.561 ACUTE PAIN OF RIGHT KNEE: ICD-10-CM

## 2023-11-12 DIAGNOSIS — M25.361 INSTABILITY OF RIGHT KNEE JOINT: ICD-10-CM

## 2023-11-12 PROCEDURE — 99214 OFFICE O/P EST MOD 30 MIN: CPT

## 2023-11-12 PROCEDURE — 3075F SYST BP GE 130 - 139MM HG: CPT

## 2023-11-12 PROCEDURE — 73564 X-RAY EXAM KNEE 4 OR MORE: CPT | Mod: RT

## 2023-11-12 PROCEDURE — 3078F DIAST BP <80 MM HG: CPT

## 2023-11-12 RX ORDER — KETOROLAC TROMETHAMINE 30 MG/ML
30 INJECTION, SOLUTION INTRAMUSCULAR; INTRAVENOUS ONCE
Status: COMPLETED | OUTPATIENT
Start: 2023-11-12 | End: 2023-11-12

## 2023-11-12 RX ORDER — METHYLPREDNISOLONE 4 MG/1
TABLET ORAL
Qty: 21 TABLET | Refills: 0 | Status: SHIPPED | OUTPATIENT
Start: 2023-11-12 | End: 2023-12-11

## 2023-11-12 RX ADMIN — KETOROLAC TROMETHAMINE 30 MG: 30 INJECTION, SOLUTION INTRAMUSCULAR; INTRAVENOUS at 09:55

## 2023-11-12 ASSESSMENT — FIBROSIS 4 INDEX: FIB4 SCORE: 0.6

## 2023-11-12 NOTE — PROGRESS NOTES
Subjective:   Toro Graves is a 53 y.o. male who presents for Knee Pain (X 5 days Rt knee pain. No known injury. Pain, swelling)      HPI:    Patient presents to  for right knee pain  Started 5 days ago, states he was coming down the stairs and he felt two large popping sounds and the knee buckled under neath him.  Has history of twisting knee injury approximately 19 years ago, resulting in knee buckling on him.   Wears knee brace occasionally  Reports medial knee pain  Swelling from knee to foot  Difficulty with extending the knee  Mild improvement with ibuprofen, last dose was yesterday, 400 mg.  Aggravated with weight bearing, bending maneuvers  Rates pain as 10/10.           ROS As above in HPI    Medications:    Current Outpatient Medications on File Prior to Visit   Medication Sig Dispense Refill    cetirizine (ZYRTEC) 10 MG Tab Take 10 mg by mouth every day.      albuterol 108 (90 Base) MCG/ACT Aero Soln inhalation aerosol Inhale 1-2 Puffs every four hours as needed for Shortness of Breath (and/or wheezing). (Patient not taking: Reported on 2023) 8 g 0    benzonatate (TESSALON) 200 MG capsule Take 1 Capsule by mouth every 8 hours as needed for Cough. (Patient not taking: Reported on 2023) 30 Capsule 0     No current facility-administered medications on file prior to visit.        Allergies:   Pcn [penicillins]    Problem List:   Patient Active Problem List   Diagnosis    Chronic left-sided back pain    Non-seasonal allergic rhinitis    Obesity (BMI 30-39.9)    MICHAEL (obstructive sleep apnea)    Former smoker    Mixed dyslipidemia        Surgical History:  No past surgical history on file.    Past Social Hx:   Social History     Tobacco Use    Smoking status: Former     Current packs/day: 0.00     Average packs/day: 1 pack/day for 30.0 years (30.0 ttl pk-yrs)     Types: Cigarettes     Start date: 1979     Quit date: 2009     Years since quittin.8    Smokeless tobacco: Never  "  Vaping Use    Vaping Use: Never used   Substance Use Topics    Alcohol use: No    Drug use: No          Problem list, medications, and allergies reviewed by myself today in Epic.     Objective:     /72   Pulse 82   Temp 36.6 °C (97.8 °F) (Temporal)   Resp 18   Ht 1.854 m (6' 1\")   Wt 113 kg (250 lb)   SpO2 96%   BMI 32.98 kg/m²     Physical Exam  Vitals and nursing note reviewed.   Constitutional:       Appearance: Normal appearance.   Cardiovascular:      Rate and Rhythm: Normal rate and regular rhythm.      Heart sounds: Normal heart sounds.   Pulmonary:      Effort: Pulmonary effort is normal.      Breath sounds: Normal breath sounds.   Abdominal:      General: Bowel sounds are normal.      Palpations: Abdomen is soft.   Musculoskeletal:         General: Tenderness and signs of injury present. No swelling or deformity.      Comments: Right medial knee is tender to palpation.  No obvious deformity, dislocation, crepitus, bony step-offs.  No tenderness to right hip, right ankle.  There is reduced range of motion with extension of the knee.  Unable to fully extend the knee.  Strength is normal and symmetric, sensation is intact to light and sharp touch, cap refill less than 2 seconds, 2+ pedal pulses.  No edema, erythema, warmth, fluctuance, ecchymosis.  No rash.  Negative valgus, varus, anterior, posterior drawer testing.  Positive Eliane's.  Gait is antalgic.   Skin:     Capillary Refill: Capillary refill takes less than 2 seconds.      Findings: No erythema.   Neurological:      Mental Status: He is alert and oriented to person, place, and time.      Gait: Gait abnormal.         Assessment/Plan:     DX-KNEE COMPLETE 4+ RIGHT 11/12/2023    Narrative  11/12/2023 10:10 AM    HISTORY/REASON FOR EXAM:  Pain/Deformity Following Trauma; medial joint tenderness, reports buckling 5 days ago coming down stairs..      TECHNIQUE/EXAM DESCRIPTION AND NUMBER OF VIEWS:  4 views of the RIGHT knee.    COMPARISON: " None    FINDINGS:  No fracture, malalignment, or gross soft tissue abnormality.    Impression  Normal exam.        Diagnosis and associated orders:   1. Acute pain of right knee  - DX-KNEE COMPLETE 4+ RIGHT; Future  - ketorolac (Toradol) injection 30 mg  - methylPREDNISolone (MEDROL DOSEPAK) 4 MG Tablet Therapy Pack; Follow schedule on package instructions.  Dispense: 21 Tablet; Refill: 0    2. Right medial knee pain  - Knee Brace  - Referral to Orthopedics  - methylPREDNISolone (MEDROL DOSEPAK) 4 MG Tablet Therapy Pack; Follow schedule on package instructions.  Dispense: 21 Tablet; Refill: 0    3. Instability of right knee joint  - Knee Brace  - Referral to Orthopedics        Comments/MDM:     Per radiology impression, right knee x-rays are negative for fracture, malalignment.  Patient reports buckling sensation, he was fitted with a hinged knee brace.  Eliane's test is positive for pain.  Patient is subtherapeutic with use of ibuprofen.  He reports use of 400 mg last night.  He tolerated Toradol injection in office.  He is instructed to avoid additional forms of ibuprofen products remainder of day may resume use tomorrow.  He is to use extreme Tylenol for remainder the day. Also advised rest, elevation, application of ice packs.   Referral to Ortho ordered for buckling sensation/instability symptoms.  Concern about possible meniscus injury  Follow-up with primary care is encouraged.  Declined work note       Please note that this dictation was created using voice recognition software. I have made a reasonable attempt to correct obvious errors, but I expect that there are errors of grammar and possibly content that I did not discover before finalizing the note.    This note was electronically signed by JORGE Mcclelland

## 2023-12-11 ENCOUNTER — OFFICE VISIT (OUTPATIENT)
Dept: URGENT CARE | Facility: PHYSICIAN GROUP | Age: 53
End: 2023-12-11
Payer: COMMERCIAL

## 2023-12-11 VITALS
DIASTOLIC BLOOD PRESSURE: 84 MMHG | SYSTOLIC BLOOD PRESSURE: 132 MMHG | RESPIRATION RATE: 18 BRPM | OXYGEN SATURATION: 97 % | WEIGHT: 259 LBS | TEMPERATURE: 97.6 F | BODY MASS INDEX: 34.33 KG/M2 | HEIGHT: 73 IN | HEART RATE: 84 BPM

## 2023-12-11 DIAGNOSIS — J22 NOSOCOMIAL INFECTION OF LOWER RESPIRATORY TRACT: ICD-10-CM

## 2023-12-11 PROCEDURE — 3075F SYST BP GE 130 - 139MM HG: CPT | Performed by: NURSE PRACTITIONER

## 2023-12-11 PROCEDURE — 99213 OFFICE O/P EST LOW 20 MIN: CPT | Performed by: NURSE PRACTITIONER

## 2023-12-11 PROCEDURE — 3079F DIAST BP 80-89 MM HG: CPT | Performed by: NURSE PRACTITIONER

## 2023-12-11 RX ORDER — AZITHROMYCIN 250 MG/1
TABLET, FILM COATED ORAL
Qty: 6 TABLET | Refills: 0 | Status: SHIPPED | OUTPATIENT
Start: 2023-12-11

## 2023-12-11 ASSESSMENT — ENCOUNTER SYMPTOMS
CARDIOVASCULAR NEGATIVE: 1
MUSCULOSKELETAL NEGATIVE: 1
CHILLS: 0
NEUROLOGICAL NEGATIVE: 1
FEVER: 0
COUGH: 1
WHEEZING: 0
GASTROINTESTINAL NEGATIVE: 1
SHORTNESS OF BREATH: 1

## 2023-12-11 ASSESSMENT — COPD QUESTIONNAIRES: COPD: 0

## 2023-12-11 ASSESSMENT — FIBROSIS 4 INDEX: FIB4 SCORE: 0.6

## 2023-12-12 NOTE — PROGRESS NOTES
"Subjective:   Toro Graves is a 53 y.o. male who presents for Cough (X1week, Dark green phlegm)      Cough  This is a new problem. The current episode started in the past 7 days. The problem has been gradually worsening. The problem occurs constantly. The cough is Productive of brown sputum. Associated symptoms include ear congestion and shortness of breath. Pertinent negatives include no chest pain, chills, fever, nasal congestion or wheezing. The symptoms are aggravated by lying down and cold air. He has tried OTC cough suppressant and rest for the symptoms. The treatment provided mild relief. His past medical history is significant for bronchitis and pneumonia. There is no history of asthma, bronchiectasis, COPD, emphysema or environmental allergies.       Review of Systems   Constitutional:  Negative for chills and fever.   HENT:  Positive for congestion.    Respiratory:  Positive for cough and shortness of breath. Negative for wheezing.    Cardiovascular: Negative.  Negative for chest pain.   Gastrointestinal: Negative.    Genitourinary: Negative.    Musculoskeletal: Negative.    Skin: Negative.    Neurological: Negative.    Endo/Heme/Allergies:  Negative for environmental allergies.       Medications, Allergies, and current problem list reviewed today in Epic.     Objective:     /84   Pulse 84   Temp 36.4 °C (97.6 °F) (Temporal)   Resp 18   Ht 1.854 m (6' 1\")   Wt 117 kg (259 lb)   SpO2 97%     Physical Exam  Vitals reviewed.   Constitutional:       General: He is not in acute distress.     Appearance: Normal appearance. He is not ill-appearing.   HENT:      Head: Normocephalic and atraumatic.      Right Ear: Tympanic membrane, ear canal and external ear normal.      Left Ear: Tympanic membrane, ear canal and external ear normal.      Nose: Congestion present.      Mouth/Throat:      Mouth: Mucous membranes are moist.      Pharynx: Oropharynx is clear.   Eyes:      Extraocular Movements: " Extraocular movements intact.      Conjunctiva/sclera: Conjunctivae normal.      Pupils: Pupils are equal, round, and reactive to light.   Cardiovascular:      Rate and Rhythm: Normal rate and regular rhythm.      Pulses: Normal pulses.      Heart sounds: Normal heart sounds.   Pulmonary:      Effort: Pulmonary effort is normal.      Breath sounds: Normal breath sounds. No wheezing, rhonchi or rales.   Abdominal:      General: Abdomen is flat. Bowel sounds are normal.      Palpations: Abdomen is soft.   Musculoskeletal:         General: Normal range of motion.      Cervical back: Normal range of motion and neck supple.   Skin:     General: Skin is warm and dry.      Capillary Refill: Capillary refill takes less than 2 seconds.   Neurological:      General: No focal deficit present.      Mental Status: He is alert and oriented to person, place, and time.   Psychiatric:         Mood and Affect: Mood normal.         Behavior: Behavior normal.         Assessment/Plan:     Diagnosis and associated orders:     1. Nosocomial infection of lower respiratory tract  azithromycin (ZITHROMAX) 250 MG Tab         Comments/MDM:     Due to duration of symptoms, history of pneumonia, and failure of OTC therapies- ABX was written to tx. For bacterial etiology for lower respiratory tract infection.  Continue OTC supportive therapies- Flonase, OTC allergy meds, avoid night time dairy. Increase fluids. Humidification.   Patient given precautionary s/sx that mandate immediate follow up and evaluation in the ED. Advised of risks of not doing so.    DDX, Supportive care, and indications for immediate follow-up discussed with patient.    Instructed to return to clinic or nearest emergency department if we are not available for any change in condition, further concerns, or worsening of symptoms.    The patient demonstrated a good understanding and agreed with the treatment plan           Differential diagnosis, natural history, supportive  care, and indications for immediate follow-up discussed.    Advised the patient to follow-up with the primary care physician for recheck, reevaluation, and consideration of further management.    Please note that this dictation was created using voice recognition software. I have made a reasonable attempt to correct obvious errors, but I expect that there are errors of grammar and possibly content that I did not discover before finalizing the note.    This note was electronically signed by JORGE Du

## 2024-05-12 SDOH — ECONOMIC STABILITY: TRANSPORTATION INSECURITY
IN THE PAST 12 MONTHS, HAS LACK OF RELIABLE TRANSPORTATION KEPT YOU FROM MEDICAL APPOINTMENTS, MEETINGS, WORK OR FROM GETTING THINGS NEEDED FOR DAILY LIVING?: PATIENT DECLINED

## 2024-05-12 SDOH — HEALTH STABILITY: MENTAL HEALTH
STRESS IS WHEN SOMEONE FEELS TENSE, NERVOUS, ANXIOUS, OR CAN'T SLEEP AT NIGHT BECAUSE THEIR MIND IS TROUBLED. HOW STRESSED ARE YOU?: PATIENT DECLINED

## 2024-05-12 SDOH — ECONOMIC STABILITY: INCOME INSECURITY: IN THE LAST 12 MONTHS, WAS THERE A TIME WHEN YOU WERE NOT ABLE TO PAY THE MORTGAGE OR RENT ON TIME?: PATIENT DECLINED

## 2024-05-12 SDOH — ECONOMIC STABILITY: HOUSING INSECURITY
IN THE LAST 12 MONTHS, WAS THERE A TIME WHEN YOU DID NOT HAVE A STEADY PLACE TO SLEEP OR SLEPT IN A SHELTER (INCLUDING NOW)?: PATIENT DECLINED

## 2024-05-12 SDOH — ECONOMIC STABILITY: INCOME INSECURITY: HOW HARD IS IT FOR YOU TO PAY FOR THE VERY BASICS LIKE FOOD, HOUSING, MEDICAL CARE, AND HEATING?: PATIENT DECLINED

## 2024-05-12 SDOH — ECONOMIC STABILITY: TRANSPORTATION INSECURITY
IN THE PAST 12 MONTHS, HAS THE LACK OF TRANSPORTATION KEPT YOU FROM MEDICAL APPOINTMENTS OR FROM GETTING MEDICATIONS?: PATIENT DECLINED

## 2024-05-12 SDOH — ECONOMIC STABILITY: HOUSING INSECURITY

## 2024-05-12 SDOH — HEALTH STABILITY: PHYSICAL HEALTH: ON AVERAGE, HOW MANY MINUTES DO YOU ENGAGE IN EXERCISE AT THIS LEVEL?: PATIENT DECLINED

## 2024-05-12 SDOH — ECONOMIC STABILITY: FOOD INSECURITY: WITHIN THE PAST 12 MONTHS, YOU WORRIED THAT YOUR FOOD WOULD RUN OUT BEFORE YOU GOT MONEY TO BUY MORE.: PATIENT DECLINED

## 2024-05-12 SDOH — ECONOMIC STABILITY: TRANSPORTATION INSECURITY
IN THE PAST 12 MONTHS, HAS LACK OF TRANSPORTATION KEPT YOU FROM MEETINGS, WORK, OR FROM GETTING THINGS NEEDED FOR DAILY LIVING?: PATIENT DECLINED

## 2024-05-12 SDOH — ECONOMIC STABILITY: FOOD INSECURITY: WITHIN THE PAST 12 MONTHS, THE FOOD YOU BOUGHT JUST DIDN'T LAST AND YOU DIDN'T HAVE MONEY TO GET MORE.: PATIENT DECLINED

## 2024-05-12 ASSESSMENT — SOCIAL DETERMINANTS OF HEALTH (SDOH)
HOW OFTEN DO YOU GET TOGETHER WITH FRIENDS OR RELATIVES?: PATIENT DECLINED
HOW OFTEN DO YOU ATTEND CHURCH OR RELIGIOUS SERVICES?: PATIENT DECLINED
HOW OFTEN DO YOU ATTENT MEETINGS OF THE CLUB OR ORGANIZATION YOU BELONG TO?: PATIENT DECLINED
DO YOU BELONG TO ANY CLUBS OR ORGANIZATIONS SUCH AS CHURCH GROUPS UNIONS, FRATERNAL OR ATHLETIC GROUPS, OR SCHOOL GROUPS?: NO
HOW OFTEN DO YOU GET TOGETHER WITH FRIENDS OR RELATIVES?: PATIENT DECLINED
HOW OFTEN DO YOU HAVE A DRINK CONTAINING ALCOHOL: NEVER
HOW OFTEN DO YOU HAVE SIX OR MORE DRINKS ON ONE OCCASION: NEVER
HOW MANY DRINKS CONTAINING ALCOHOL DO YOU HAVE ON A TYPICAL DAY WHEN YOU ARE DRINKING: PATIENT DOES NOT DRINK
DO YOU BELONG TO ANY CLUBS OR ORGANIZATIONS SUCH AS CHURCH GROUPS UNIONS, FRATERNAL OR ATHLETIC GROUPS, OR SCHOOL GROUPS?: NO
HOW OFTEN DO YOU ATTEND CHURCH OR RELIGIOUS SERVICES?: PATIENT DECLINED
IN A TYPICAL WEEK, HOW MANY TIMES DO YOU TALK ON THE PHONE WITH FAMILY, FRIENDS, OR NEIGHBORS?: PATIENT DECLINED
HOW HARD IS IT FOR YOU TO PAY FOR THE VERY BASICS LIKE FOOD, HOUSING, MEDICAL CARE, AND HEATING?: PATIENT DECLINED
IN A TYPICAL WEEK, HOW MANY TIMES DO YOU TALK ON THE PHONE WITH FAMILY, FRIENDS, OR NEIGHBORS?: PATIENT DECLINED
WITHIN THE PAST 12 MONTHS, YOU WORRIED THAT YOUR FOOD WOULD RUN OUT BEFORE YOU GOT THE MONEY TO BUY MORE: PATIENT DECLINED
HOW OFTEN DO YOU ATTENT MEETINGS OF THE CLUB OR ORGANIZATION YOU BELONG TO?: PATIENT DECLINED

## 2024-05-12 ASSESSMENT — LIFESTYLE VARIABLES
AUDIT-C TOTAL SCORE: 0
HOW OFTEN DO YOU HAVE SIX OR MORE DRINKS ON ONE OCCASION: NEVER
HOW OFTEN DO YOU HAVE A DRINK CONTAINING ALCOHOL: NEVER
SKIP TO QUESTIONS 9-10: 1
HOW MANY STANDARD DRINKS CONTAINING ALCOHOL DO YOU HAVE ON A TYPICAL DAY: PATIENT DOES NOT DRINK

## 2024-05-13 ENCOUNTER — HOSPITAL ENCOUNTER (OUTPATIENT)
Dept: LAB | Facility: MEDICAL CENTER | Age: 54
End: 2024-05-13
Attending: FAMILY MEDICINE
Payer: COMMERCIAL

## 2024-05-13 ENCOUNTER — OFFICE VISIT (OUTPATIENT)
Dept: MEDICAL GROUP | Facility: LAB | Age: 54
End: 2024-05-13
Payer: COMMERCIAL

## 2024-05-13 VITALS
BODY MASS INDEX: 33.8 KG/M2 | OXYGEN SATURATION: 96 % | HEIGHT: 73 IN | TEMPERATURE: 97.2 F | HEART RATE: 79 BPM | RESPIRATION RATE: 16 BRPM | SYSTOLIC BLOOD PRESSURE: 110 MMHG | WEIGHT: 255 LBS | DIASTOLIC BLOOD PRESSURE: 66 MMHG

## 2024-05-13 DIAGNOSIS — Z11.4 SCREENING FOR HIV (HUMAN IMMUNODEFICIENCY VIRUS): ICD-10-CM

## 2024-05-13 DIAGNOSIS — Z13.6 SCREENING FOR CARDIOVASCULAR CONDITION: ICD-10-CM

## 2024-05-13 DIAGNOSIS — Z11.59 NEED FOR HEPATITIS C SCREENING TEST: ICD-10-CM

## 2024-05-13 DIAGNOSIS — R06.02 SOB (SHORTNESS OF BREATH): ICD-10-CM

## 2024-05-13 DIAGNOSIS — J30.89 NON-SEASONAL ALLERGIC RHINITIS, UNSPECIFIED TRIGGER: ICD-10-CM

## 2024-05-13 DIAGNOSIS — G47.33 OSA (OBSTRUCTIVE SLEEP APNEA): ICD-10-CM

## 2024-05-13 DIAGNOSIS — Z00.00 ANNUAL PHYSICAL EXAM: ICD-10-CM

## 2024-05-13 LAB
ALBUMIN SERPL BCP-MCNC: 4.5 G/DL (ref 3.2–4.9)
ALBUMIN/GLOB SERPL: 1.8 G/DL
ALP SERPL-CCNC: 106 U/L (ref 30–99)
ALT SERPL-CCNC: 30 U/L (ref 2–50)
ANION GAP SERPL CALC-SCNC: 11 MMOL/L (ref 7–16)
AST SERPL-CCNC: 23 U/L (ref 12–45)
BILIRUB SERPL-MCNC: 0.5 MG/DL (ref 0.1–1.5)
BUN SERPL-MCNC: 12 MG/DL (ref 8–22)
CALCIUM ALBUM COR SERPL-MCNC: 8.7 MG/DL (ref 8.5–10.5)
CALCIUM SERPL-MCNC: 9.1 MG/DL (ref 8.5–10.5)
CHLORIDE SERPL-SCNC: 107 MMOL/L (ref 96–112)
CHOLEST SERPL-MCNC: 189 MG/DL (ref 100–199)
CO2 SERPL-SCNC: 23 MMOL/L (ref 20–33)
CREAT SERPL-MCNC: 0.89 MG/DL (ref 0.5–1.4)
ERYTHROCYTE [DISTWIDTH] IN BLOOD BY AUTOMATED COUNT: 34.5 FL (ref 35.9–50)
GFR SERPLBLD CREATININE-BSD FMLA CKD-EPI: 101 ML/MIN/1.73 M 2
GLOBULIN SER CALC-MCNC: 2.5 G/DL (ref 1.9–3.5)
GLUCOSE SERPL-MCNC: 87 MG/DL (ref 65–99)
HCT VFR BLD AUTO: 42.1 % (ref 42–52)
HCV AB SER QL: NORMAL
HDLC SERPL-MCNC: 28 MG/DL
HGB BLD-MCNC: 15.1 G/DL (ref 14–18)
HIV 1+2 AB+HIV1 P24 AG SERPL QL IA: NORMAL
LDLC SERPL CALC-MCNC: 105 MG/DL
MCH RBC QN AUTO: 29 PG (ref 27–33)
MCHC RBC AUTO-ENTMCNC: 35.9 G/DL (ref 32.3–36.5)
MCV RBC AUTO: 81 FL (ref 81.4–97.8)
PLATELET # BLD AUTO: 285 K/UL (ref 164–446)
PMV BLD AUTO: 9.5 FL (ref 9–12.9)
POTASSIUM SERPL-SCNC: 4.1 MMOL/L (ref 3.6–5.5)
PROT SERPL-MCNC: 7 G/DL (ref 6–8.2)
RBC # BLD AUTO: 5.2 M/UL (ref 4.7–6.1)
SODIUM SERPL-SCNC: 141 MMOL/L (ref 135–145)
TRIGL SERPL-MCNC: 280 MG/DL (ref 0–149)
WBC # BLD AUTO: 6.5 K/UL (ref 4.8–10.8)

## 2024-05-13 PROCEDURE — 3074F SYST BP LT 130 MM HG: CPT | Performed by: FAMILY MEDICINE

## 2024-05-13 PROCEDURE — 99396 PREV VISIT EST AGE 40-64: CPT | Performed by: FAMILY MEDICINE

## 2024-05-13 PROCEDURE — 3078F DIAST BP <80 MM HG: CPT | Performed by: FAMILY MEDICINE

## 2024-05-13 ASSESSMENT — FIBROSIS 4 INDEX: FIB4 SCORE: 0.61

## 2024-05-13 NOTE — PROGRESS NOTES
Verbal consent was acquired by the patient to use [employer] ambient listening note generation during this visit Yes    Subjective:   Toro Graves is a 54 y.o. male here today for   Chief Complaint   Patient presents with    Annual Exam       History of Present Illness  The patient is here today for annual physical exam.    He sustained a knee fracture while walking stairs at his workplace at the beginning of the year, which continues to cause him intermittent discomfort. Despite being advised to use a brace until 06/2024, he has opted for the latter. He denies experiencing any numbness, tingling, or weakness in his feet. However, he acknowledges a perceived decline in his weight, which has limited his physical activity.    The patient's wife has observed him experiencing nocturnal wheezing and patient reports dyspnea during prolonged ambulation. These symptoms have been ongoing for approximately 6 to 7 months. He suspects that his dyspnea may be related to his weight. He possesses a CPAP machine, however, he is unable to sleep with it on due to its movement. He is uncertain if his nocturnal wheezing is related to sleep apnea or other pulmonary issues. He also experiences intermittent wheezing during the day. He denies nocturnal wheezing or gasping for air. His wheezing typically begins with a cough, which resolves spontaneously. He suspects that his allergy symptoms may be contributing to his dyspnea. He occasionally experiences nocturnal heartburn, particularly after eating and upon retiring to bed. He denies experiencing palpitations, abdominal pain, nausea, vomiting, diarrhea, constipation, depression, or anxiety. His diet and exercise have been limited recently. His hearing or vision are normal. He denies lymphadenopathy. His current treatment regimen includes Zyrtec.    The patient reports occasional epistaxis, which he attributes to persistent sinus drainage.   The patient denies smoking, alcohol  "intake, or drug use.          Allergies   Allergen Reactions    Pcn [Penicillins] Anaphylaxis     Per patient         Current medicines (including changes today)  Current Outpatient Medications   Medication Sig Dispense Refill    cetirizine (ZYRTEC) 10 MG Tab Take 10 mg by mouth every day.      azithromycin (ZITHROMAX) 250 MG Tab Take two tablets on the first day, then one tablet daily for 4 days. 6 Tablet 0     No current facility-administered medications for this visit.     He  has a past medical history of Allergy, Asthma, and Bronchitis.    ROS   ROS  -See HPI     Objective:     Physical Exam:  /66   Pulse 79   Temp 36.2 °C (97.2 °F) (Temporal)   Resp 16   Ht 1.854 m (6' 1\")   Wt 116 kg (255 lb)   SpO2 96%  Body mass index is 33.64 kg/m².   Constitutional: Alert, no distress.  Skin: Warm, dry, good turgor, no rashes in visible areas.  Eye: Equal, round and reactive, conjunctiva clear, lids normal.  ENMT: TM's clear bilaterally, lips without lesions, good dentition, oropharynx clear.  Neck: Trachea midline, no masses, no thyromegaly. No cervical or supraclavicular lymphadenopathy.  Respiratory: Unlabored respiratory effort, lungs clear to auscultation, no wheezes, no rhonchi.  Cardiovascular: Normal S1, S2, no murmur, no edema.  Abdomen: Soft, non-tender, no masses, no hepatosplenomegaly.  Psych: Alert and oriented x3, normal affect and mood.    Results      Assessment and Plan:     Assessment & Plan  1. Annual physical examination.  The patient's vital signs are within normal limits. His weight has decreased from 259 pounds at the beginning of the year to 255 pounds today. A fasting blood work was ordered, including cholesterol, hepatitis C, and HIV screening.  -All questions concerns were answered at this time.  -Discussed the importance of a healthy, Mediterranean style diet, routine exercise regimen.  -Discussed general safety measures including seatbelts, helmets, avoidance of smoking, sunscreen, " hydration.  -Follow-up for general physical exam on a yearly basis, sooner if needed.    2. Knee fracture.  The patient was informed about the potential for delayed healing with compression therapy. Should the patient continue to experience pain, he was advised to revert to the brace as much as possible.    4. Wheezing and dyspnea.  New, acute problem with unknown etiology or prognosis.  Differential diagnosis is broad including COPD given prior tobacco use and history, complications from MICHAEL, GERD, asthma secondary to allergic rhinitis.  The patient's symptoms could be attributed to deconditioning due to reduced activity on the knee over the past 5.5 to 6 months, or allergies. Sleep apnea could also be a contributing factor. The patient was advised to continue with his Zyrtec regimen and to use Flonase, 2 sprays in each nostril daily. He was also encouraged to engage in regular exercise and work to regain his breath. He was also advised to find a suitable mask for his sleep apnea. Pulmonary function tests will be ordered to rule out COPD or asthma.      Orders:  1. Annual physical exam  - CBC WITHOUT DIFFERENTIAL; Future  - Comp Metabolic Panel; Future    2. SOB (shortness of breath)  - PULMONARY FUNCTION TESTS -Test requested: Complete Pulmonary Function Test; Future    3. MICHAEL (obstructive sleep apnea)    4. Non-seasonal allergic rhinitis, unspecified trigger    5. Need for hepatitis C screening test  - HEP C VIRUS ANTIBODY; Future    6. Screening for HIV (human immunodeficiency virus)  - HIV AG/AB COMBO ASSAY SCREENING; Future    7. Screening for cardiovascular condition  - Lipid Profile; Future        Followup: No follow-ups on file.         PLEASE NOTE: This dictation was created using voice recognition and [employer] ambient listening software. I have made every reasonable attempt to correct obvious errors, but I expect that there are errors of grammar and possibly content that I did not discover before  finalizing the note.

## 2024-05-16 ENCOUNTER — NON-PROVIDER VISIT (OUTPATIENT)
Dept: SLEEP MEDICINE | Facility: MEDICAL CENTER | Age: 54
End: 2024-05-16
Attending: FAMILY MEDICINE
Payer: COMMERCIAL

## 2024-05-16 VITALS — WEIGHT: 255 LBS | HEIGHT: 73 IN | BODY MASS INDEX: 33.8 KG/M2

## 2024-05-16 DIAGNOSIS — R06.02 SOB (SHORTNESS OF BREATH): ICD-10-CM

## 2024-05-16 PROCEDURE — 94729 DIFFUSING CAPACITY: CPT | Performed by: FAMILY MEDICINE

## 2024-05-16 PROCEDURE — 94726 PLETHYSMOGRAPHY LUNG VOLUMES: CPT | Mod: 26 | Performed by: INTERNAL MEDICINE

## 2024-05-16 PROCEDURE — 94060 EVALUATION OF WHEEZING: CPT | Mod: 26 | Performed by: INTERNAL MEDICINE

## 2024-05-16 PROCEDURE — 94060 EVALUATION OF WHEEZING: CPT | Performed by: FAMILY MEDICINE

## 2024-05-16 PROCEDURE — 94726 PLETHYSMOGRAPHY LUNG VOLUMES: CPT | Performed by: FAMILY MEDICINE

## 2024-05-16 PROCEDURE — 94729 DIFFUSING CAPACITY: CPT | Mod: 26 | Performed by: INTERNAL MEDICINE

## 2024-05-16 ASSESSMENT — PULMONARY FUNCTION TESTS
FEV1/FVC_PERCENT_PREDICTED: 88
FEV1: 2.8
FEV1_PREDICTED: 4.07
FEV1/FVC: 69
FEV1/FVC_PERCENT_CHANGE: 100
FVC_PERCENT_PREDICTED: 63
FVC: 3.32
FEV1/FVC_PERCENT_LLN: 65
FEV1/FVC_PERCENT_PREDICTED: 78
FVC_PREDICTED: 5.25
FEV1/FVC_PERCENT_CHANGE: 3
FEV1_PERCENT_CHANGE: 14
FEV1_LLN: 3.40
FEV1/FVC_PERCENT_PREDICTED: 85
FEV1_PERCENT_PREDICTED: 68
FEV1: 2.21
FEV1_PERCENT_CHANGE: 14
FEV1/FVC_PERCENT_PREDICTED: 86
FVC: 4.08
FEV1_PERCENT_PREDICTED: 54
FVC_PERCENT_PREDICTED: 77
FEV1/FVC: 66
FEV1/FVC_PREDICTED: 78
FVC_LLN: 4.38
FEV1/FVC_PERCENT_PREDICTED: 88
FEV1/FVC: 68.63
FEV1/FVC: 67

## 2024-05-16 ASSESSMENT — FIBROSIS 4 INDEX: FIB4 SCORE: 0.81

## 2024-05-16 NOTE — PROCEDURES
Technician: DONNA Cooper    Technician Comment:  Good patient effort & cooperation.  The results of this test meet the ATS/ERS standards for acceptability & reproducibility.  Test was performed on the [employer] Body Plethysmograph-Elite DX system.  Predicted values were GLI-2012 for spirometry, GLI-2020 for DLCO, ITS for Lung Volumes.  The DLCO was uncorrected for Hgb.  A bronchodilator of Albuterol HFA -2puffs via spacer administered.  DLCO performed during dilation period.    Interpretation:

## 2024-06-07 ENCOUNTER — OFFICE VISIT (OUTPATIENT)
Dept: MEDICAL GROUP | Facility: LAB | Age: 54
End: 2024-06-07
Payer: COMMERCIAL

## 2024-06-07 VITALS
SYSTOLIC BLOOD PRESSURE: 112 MMHG | WEIGHT: 255 LBS | HEART RATE: 76 BPM | DIASTOLIC BLOOD PRESSURE: 74 MMHG | BODY MASS INDEX: 33.8 KG/M2 | OXYGEN SATURATION: 97 % | RESPIRATION RATE: 18 BRPM | HEIGHT: 73 IN | TEMPERATURE: 97.3 F

## 2024-06-07 DIAGNOSIS — J44.9 OBSTRUCTIVE LUNG DISEASE (GENERALIZED) (HCC): ICD-10-CM

## 2024-06-07 PROCEDURE — 99214 OFFICE O/P EST MOD 30 MIN: CPT | Performed by: FAMILY MEDICINE

## 2024-06-07 PROCEDURE — 3074F SYST BP LT 130 MM HG: CPT | Performed by: FAMILY MEDICINE

## 2024-06-07 PROCEDURE — 3078F DIAST BP <80 MM HG: CPT | Performed by: FAMILY MEDICINE

## 2024-06-07 RX ORDER — ALBUTEROL SULFATE 90 UG/1
2 AEROSOL, METERED RESPIRATORY (INHALATION) EVERY 4 HOURS PRN
Qty: 1 EACH | Refills: 3 | Status: SHIPPED | OUTPATIENT
Start: 2024-06-07

## 2024-06-07 RX ORDER — BUDESONIDE AND FORMOTEROL FUMARATE DIHYDRATE 80; 4.5 UG/1; UG/1
2 AEROSOL RESPIRATORY (INHALATION) 2 TIMES DAILY
Qty: 10.2 G | Refills: 3 | Status: SHIPPED | OUTPATIENT
Start: 2024-06-07

## 2024-06-07 ASSESSMENT — FIBROSIS 4 INDEX: FIB4 SCORE: 0.81

## 2024-06-07 NOTE — PROGRESS NOTES
"Verbal consent was acquired by the patient to use [employer] ambient listening note generation during this visit Yes    Subjective:   Toro Graves is a 54 y.o. male here today for   Chief Complaint   Patient presents with    Results   The 10-year ASCVD risk score (Gwyn NUGENT, et al., 2019) is: 6.9%    History of Present Illness  The patient is a 54-year-old male here today to review lab results. The patient recently had labs completed on 05/13/2024. Lab results do show a slightly elevated alkaline phosphatase at 106, elevated triglycerides at 280, decreased HDL at 28, elevated LDL at 105.     The patient's recent pulmonary function test revealed moderate obstructive lung disease. He has previously utilized an inhaler for bronchitis.    Patient continues to have some symptoms of exercise intolerance, shortness of breath, cough.  Denies any fevers, chills, chest pain, palpitations.      Allergies   Allergen Reactions    Pcn [Penicillins] Anaphylaxis     Per patient         Current medicines (including changes today)  Current Outpatient Medications   Medication Sig Dispense Refill    cetirizine (ZYRTEC) 10 MG Tab Take 10 mg by mouth every day.       No current facility-administered medications for this visit.     He  has a past medical history of Allergy, Asthma, and Bronchitis.    ROS   ROS  -See HPI     Objective:     Physical Exam:  /74   Pulse 76   Temp 36.3 °C (97.3 °F) (Temporal)   Resp 18   Ht 1.854 m (6' 0.99\")   Wt 116 kg (255 lb)   SpO2 97%  Body mass index is 33.65 kg/m².   Constitutional: Alert, no distress.  Skin: Warm, dry, good turgor, no rashes in visible areas.  Eye: Equal, round and reactive, conjunctiva clear, lids normal.  Respiratory: Unlabored respiratory effort, lungs clear to auscultation, no wheezes, no rhonchi.  Increased lung sounds noted.  Cardiovascular: Normal S1, S2, no murmur, no edema.  Abdomen: Soft, non-tender, no masses, no hepatosplenomegaly.  Psych: Alert " and oriented x3, normal affect and mood.    Results  Laboratory Studies  Alkaline phosphatase slightly elevated at 106. Triglycerides elevated at 280. Decreased HDL at 28. LDL at 105.    Testing  Breathing test shows moderate obstructive lung disease with FEV1 at 63%.    Assessment and Plan:     Assessment & Plan  1. Moderate obstructive lung disease.  New problem.  This is a new chronic problem that will require initiation of new medications.  Most likely this was caused by the patient's previous history of smoking.  A prescription for Symbicort, to be administered as 2 puffs twice daily, has been issued. The patient has been advised to rinse his mouth with water post-inhaler use. Albuterol has been prescribed for use as needed for shortness of breath.    2. Elevated cholesterol.  The patient has been advised to maintain a healthy diet and engage in 20 to 30 minutes of exercise daily.  Continue to check labs every 6 to 12 months.    Follow-up  A follow-up appointment is scheduled for the patient in a few months.    Orders:  1. Obstructive lung disease (generalized) (HCC)  - albuterol 108 (90 Base) MCG/ACT Aero Soln inhalation aerosol; Inhale 2 Puffs every four hours as needed for Shortness of Breath.  Dispense: 1 Each; Refill: 3  - budesonide-formoterol (SYMBICORT) 80-4.5 MCG/ACT Aerosol; Inhale 2 Puffs 2 times a day.  Dispense: 10.2 g; Refill: 3        Followup: No follow-ups on file.         PLEASE NOTE: This dictation was created using voice recognition and [employer] ambient listening software. I have made every reasonable attempt to correct obvious errors, but I expect that there are errors of grammar and possibly content that I did not discover before finalizing the note.

## 2024-07-09 ENCOUNTER — OFFICE VISIT (OUTPATIENT)
Dept: URGENT CARE | Facility: PHYSICIAN GROUP | Age: 54
End: 2024-07-09
Payer: COMMERCIAL

## 2024-07-09 VITALS
HEIGHT: 73 IN | OXYGEN SATURATION: 97 % | TEMPERATURE: 97.7 F | RESPIRATION RATE: 16 BRPM | SYSTOLIC BLOOD PRESSURE: 136 MMHG | DIASTOLIC BLOOD PRESSURE: 72 MMHG | BODY MASS INDEX: 33.8 KG/M2 | HEART RATE: 72 BPM | WEIGHT: 255 LBS

## 2024-07-09 DIAGNOSIS — J44.1 CHRONIC OBSTRUCTIVE PULMONARY DISEASE WITH ACUTE EXACERBATION (HCC): ICD-10-CM

## 2024-07-09 PROCEDURE — 3075F SYST BP GE 130 - 139MM HG: CPT | Performed by: PHYSICIAN ASSISTANT

## 2024-07-09 PROCEDURE — 3078F DIAST BP <80 MM HG: CPT | Performed by: PHYSICIAN ASSISTANT

## 2024-07-09 PROCEDURE — 99214 OFFICE O/P EST MOD 30 MIN: CPT | Performed by: PHYSICIAN ASSISTANT

## 2024-07-09 RX ORDER — BENZONATATE 100 MG/1
100 CAPSULE ORAL 3 TIMES DAILY PRN
Qty: 30 CAPSULE | Refills: 0 | Status: SHIPPED | OUTPATIENT
Start: 2024-07-09

## 2024-07-09 RX ORDER — DEXTROMETHORPHAN HYDROBROMIDE AND PROMETHAZINE HYDROCHLORIDE 15; 6.25 MG/5ML; MG/5ML
5 SYRUP ORAL 3 TIMES DAILY PRN
Qty: 120 ML | Refills: 0 | Status: SHIPPED | OUTPATIENT
Start: 2024-07-09

## 2024-07-09 RX ORDER — PREDNISONE 20 MG/1
TABLET ORAL
Qty: 10 TABLET | Refills: 0 | Status: SHIPPED | OUTPATIENT
Start: 2024-07-09

## 2024-07-09 ASSESSMENT — ENCOUNTER SYMPTOMS
RHINORRHEA: 1
NAUSEA: 0
COUGH: 1
SPUTUM PRODUCTION: 1
FEVER: 0
MYALGIAS: 0
WHEEZING: 0
ABDOMINAL PAIN: 0
PALPITATIONS: 0
CONSTITUTIONAL NEGATIVE: 1
SHORTNESS OF BREATH: 0
NEUROLOGICAL NEGATIVE: 1
CHILLS: 0
CARDIOVASCULAR NEGATIVE: 1
DIARRHEA: 0
SORE THROAT: 0
VOMITING: 0

## 2024-07-09 ASSESSMENT — FIBROSIS 4 INDEX: FIB4 SCORE: 0.81

## 2024-07-09 ASSESSMENT — COPD QUESTIONNAIRES: COPD: 1

## 2024-08-19 ENCOUNTER — APPOINTMENT (OUTPATIENT)
Dept: MEDICAL GROUP | Facility: LAB | Age: 54
End: 2024-08-19
Payer: COMMERCIAL

## 2024-08-19 VITALS
RESPIRATION RATE: 18 BRPM | HEART RATE: 96 BPM | TEMPERATURE: 97.3 F | DIASTOLIC BLOOD PRESSURE: 62 MMHG | WEIGHT: 240 LBS | SYSTOLIC BLOOD PRESSURE: 110 MMHG | BODY MASS INDEX: 31.81 KG/M2 | HEIGHT: 73 IN | OXYGEN SATURATION: 97 %

## 2024-08-19 DIAGNOSIS — J44.9 OBSTRUCTIVE LUNG DISEASE (GENERALIZED) (HCC): ICD-10-CM

## 2024-08-19 DIAGNOSIS — Z23 NEED FOR VACCINATION: ICD-10-CM

## 2024-08-19 PROCEDURE — 90471 IMMUNIZATION ADMIN: CPT | Performed by: FAMILY MEDICINE

## 2024-08-19 PROCEDURE — 3078F DIAST BP <80 MM HG: CPT | Performed by: FAMILY MEDICINE

## 2024-08-19 PROCEDURE — 90677 PCV20 VACCINE IM: CPT | Performed by: FAMILY MEDICINE

## 2024-08-19 PROCEDURE — 3074F SYST BP LT 130 MM HG: CPT | Performed by: FAMILY MEDICINE

## 2024-08-19 PROCEDURE — 99213 OFFICE O/P EST LOW 20 MIN: CPT | Mod: 25 | Performed by: FAMILY MEDICINE

## 2024-08-19 RX ORDER — FLUTICASONE FUROATE, UMECLIDINIUM BROMIDE AND VILANTEROL TRIFENATATE 200; 62.5; 25 UG/1; UG/1; UG/1
1 POWDER RESPIRATORY (INHALATION) DAILY
Qty: 60 EACH | Refills: 3 | Status: SHIPPED | OUTPATIENT
Start: 2024-08-19

## 2024-08-19 ASSESSMENT — FIBROSIS 4 INDEX: FIB4 SCORE: 0.81

## 2024-08-19 NOTE — PROGRESS NOTES
Verbal consent was acquired by the patient to use [employer] ambient listening note generation during this visit Yes    Subjective:   Toro Graves is a 54 y.o. male here today for   Chief Complaint   Patient presents with    Follow-Up     History of Present Illness  The patient is a 54-year-old male here today for a follow-up visit regarding his inhaler use for COPD management.    He has a history of COPD, currently managed with Symbicort daily and Albuterol as needed. He was recently seen in urgent care approximately 1 month ago for a COPD exacerbation, at which time he was treated with prednisone and promethazine-DM cough syrup. He recalls a persistent cough that led him to seek urgent care, where he was advised to seek medical attention whenever he experiences a cough due to his lung disease.    He reports feeling well overall. He uses Symbicort twice a day, once in the morning and once at night. He typically uses Albuterol twice a day, once in the morning and once at night, and also when engaging in physical activities like walking. He uses a spacer with his inhalers. He has not used Albuterol today. He has a nebulizer and a couple of compact ones but lacks the necessary medication for it. The accompanying adult female notes that he frequently experiences shortness of breath and uses Albuterol when he feels winded.    He underwent pulmonary function tests a while ago and was prescribed inhalers. Today's visit is to assess the effectiveness of these inhalers.    He has experienced weight loss due to having 8 teeth removed, which has affected his ability to eat. He has dentures, but they don't fit well due to his gums not healing properly. He has an appointment next week to have them realigned.      Allergies   Allergen Reactions    Pcn [Penicillins] Anaphylaxis     Per patient         Current medicines (including changes today)  Current Outpatient Medications   Medication Sig Dispense Refill     "predniSONE (DELTASONE) 20 MG Tab Take 2 tabs p.o. daily x 5 days 10 Tablet 0    benzonatate (TESSALON) 100 MG Cap Take 1 Capsule by mouth 3 times a day as needed for Cough. 30 Capsule 0    promethazine-dextromethorphan (PROMETHAZINE-DM) 6.25-15 MG/5ML syrup Take 5 mL by mouth 3 times a day as needed for Cough. 120 mL 0    albuterol 108 (90 Base) MCG/ACT Aero Soln inhalation aerosol Inhale 2 Puffs every four hours as needed for Shortness of Breath. 1 Each 3    budesonide-formoterol (SYMBICORT) 80-4.5 MCG/ACT Aerosol Inhale 2 Puffs 2 times a day. 10.2 g 3    cetirizine (ZYRTEC) 10 MG Tab Take 10 mg by mouth every day.       No current facility-administered medications for this visit.     He  has a past medical history of Allergy, Asthma, and Bronchitis.    ROS   ROS  -See HPI     Objective:     Physical Exam:  /62   Pulse 96   Temp 36.3 °C (97.3 °F) (Temporal)   Resp 18   Ht 1.854 m (6' 0.99\")   Wt 109 kg (240 lb)   SpO2 97%  Body mass index is 31.67 kg/m².   Constitutional: Alert, no distress.  Skin: Warm, dry, good turgor, no rashes in visible areas.  Neck: Trachea midline, no masses, no thyromegaly. No cervical or supraclavicular lymphadenopathy.  Respiratory: Unlabored respiratory effort, lungs clear to auscultation, no wheezes, no rhonchi.  Cardiovascular: Normal S1, S2, no murmur, no edema.  Psych: Alert and oriented x3, normal affect and mood.    Results      Assessment and Plan:     Assessment & Plan  1. Chronic Obstructive Pulmonary Disease (COPD).  The patient's daily use of albuterol indicates suboptimal control of his COPD. A transition to Trelegy Ellipta, one puff daily in the morning, is recommended. He is advised to rinse his mouth thoroughly after each use to prevent oral thrush. The use of Symbicort will be discontinued, while albuterol will be continued as needed. The prescription for Trelegy Ellipta will be sent to the [facility] pharmacy.    2. Health Maintenance.  A pneumococcal vaccine " will be administered today to protect against pneumococcal bacteria, which can cause severe pneumonia, especially in individuals with compromised lungs.    Follow-up  A follow-up visit is scheduled in a couple of months to monitor his progress.    Orders:  1. Obstructive lung disease (generalized) (HCC)  - fluticasone-umeclidinium-vilanterol (TRELEGY ELLIPTA) 200-62.5-25 mcg/act inhaler; Inhale 1 Puff every day.  Dispense: 60 Each; Refill: 3    2. Need for vaccination  - Pneumococcal Conjugate Vaccine 20-Valent        Followup: No follow-ups on file.         PLEASE NOTE: This dictation was created using voice recognition and [employer] ambient listening software. I have made every reasonable attempt to correct obvious errors, but I expect that there are errors of grammar and possibly content that I did not discover before finalizing the note.

## 2024-10-21 ENCOUNTER — APPOINTMENT (OUTPATIENT)
Dept: MEDICAL GROUP | Facility: LAB | Age: 54
End: 2024-10-21
Payer: COMMERCIAL

## 2024-10-28 ENCOUNTER — OFFICE VISIT (OUTPATIENT)
Dept: URGENT CARE | Facility: PHYSICIAN GROUP | Age: 54
End: 2024-10-28
Payer: COMMERCIAL

## 2024-10-28 VITALS
WEIGHT: 241.2 LBS | HEIGHT: 73 IN | RESPIRATION RATE: 18 BRPM | SYSTOLIC BLOOD PRESSURE: 102 MMHG | BODY MASS INDEX: 31.97 KG/M2 | TEMPERATURE: 97.2 F | HEART RATE: 69 BPM | OXYGEN SATURATION: 98 % | DIASTOLIC BLOOD PRESSURE: 80 MMHG

## 2024-10-28 DIAGNOSIS — M77.12 LATERAL EPICONDYLITIS OF LEFT ELBOW: ICD-10-CM

## 2024-10-28 PROCEDURE — 3079F DIAST BP 80-89 MM HG: CPT | Performed by: PHYSICIAN ASSISTANT

## 2024-10-28 PROCEDURE — 99213 OFFICE O/P EST LOW 20 MIN: CPT | Performed by: PHYSICIAN ASSISTANT

## 2024-10-28 PROCEDURE — 3074F SYST BP LT 130 MM HG: CPT | Performed by: PHYSICIAN ASSISTANT

## 2024-10-28 RX ORDER — METHYLPREDNISOLONE 4 MG/1
4 TABLET ORAL DAILY
Qty: 21 TABLET | Refills: 0 | Status: SHIPPED | OUTPATIENT
Start: 2024-10-28

## 2024-10-28 RX ORDER — NAPROXEN 500 MG/1
500 TABLET ORAL 2 TIMES DAILY WITH MEALS
Qty: 30 TABLET | Refills: 0 | Status: SHIPPED | OUTPATIENT
Start: 2024-10-28

## 2024-10-28 ASSESSMENT — ENCOUNTER SYMPTOMS
TINGLING: 0
MYALGIAS: 1
WEAKNESS: 0

## 2024-10-28 ASSESSMENT — FIBROSIS 4 INDEX: FIB4 SCORE: 0.81

## 2024-11-04 ENCOUNTER — OFFICE VISIT (OUTPATIENT)
Dept: MEDICAL GROUP | Facility: LAB | Age: 54
End: 2024-11-04
Payer: COMMERCIAL

## 2024-11-04 ENCOUNTER — APPOINTMENT (OUTPATIENT)
Dept: MEDICAL GROUP | Facility: LAB | Age: 54
End: 2024-11-04
Payer: COMMERCIAL

## 2024-11-04 VITALS
OXYGEN SATURATION: 97 % | HEART RATE: 77 BPM | HEIGHT: 73 IN | BODY MASS INDEX: 31.41 KG/M2 | SYSTOLIC BLOOD PRESSURE: 110 MMHG | TEMPERATURE: 98.4 F | RESPIRATION RATE: 18 BRPM | WEIGHT: 237 LBS | DIASTOLIC BLOOD PRESSURE: 66 MMHG

## 2024-11-04 DIAGNOSIS — J44.9 OBSTRUCTIVE LUNG DISEASE (GENERALIZED) (HCC): ICD-10-CM

## 2024-11-04 DIAGNOSIS — G47.33 OSA (OBSTRUCTIVE SLEEP APNEA): ICD-10-CM

## 2024-11-04 DIAGNOSIS — M77.12 LATERAL EPICONDYLITIS OF LEFT ELBOW: ICD-10-CM

## 2024-11-04 PROCEDURE — 3074F SYST BP LT 130 MM HG: CPT | Performed by: FAMILY MEDICINE

## 2024-11-04 PROCEDURE — 3078F DIAST BP <80 MM HG: CPT | Performed by: FAMILY MEDICINE

## 2024-11-04 PROCEDURE — 99214 OFFICE O/P EST MOD 30 MIN: CPT | Performed by: FAMILY MEDICINE

## 2024-11-04 ASSESSMENT — FIBROSIS 4 INDEX: FIB4 SCORE: 0.81
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
## 2024-12-03 ENCOUNTER — HOME STUDY (OUTPATIENT)
Dept: SLEEP MEDICINE | Facility: MEDICAL CENTER | Age: 54
End: 2024-12-03
Attending: FAMILY MEDICINE
Payer: COMMERCIAL

## 2024-12-03 DIAGNOSIS — G47.33 OSA (OBSTRUCTIVE SLEEP APNEA): ICD-10-CM

## 2024-12-03 DIAGNOSIS — J44.9 OBSTRUCTIVE LUNG DISEASE (GENERALIZED) (HCC): ICD-10-CM

## 2024-12-03 PROCEDURE — 95800 SLP STDY UNATTENDED: CPT | Performed by: STUDENT IN AN ORGANIZED HEALTH CARE EDUCATION/TRAINING PROGRAM

## 2024-12-03 RX ORDER — FLUTICASONE FUROATE, UMECLIDINIUM BROMIDE AND VILANTEROL TRIFENATATE 200; 62.5; 25 UG/1; UG/1; UG/1
1 POWDER RESPIRATORY (INHALATION) DAILY
Qty: 60 EACH | Refills: 3 | Status: SHIPPED | OUTPATIENT
Start: 2024-12-03

## 2024-12-03 NOTE — TELEPHONE ENCOUNTER
Received request via: Pharmacy    Was the patient seen in the last year in this department? Yes    Does the patient have an active prescription (recently filled or refills available) for medication(s) requested? No    Pharmacy Name: [facility]    Does the patient have FPC Plus and need 100-day supply? (This applies to ALL medications)

## 2024-12-09 NOTE — PROCEDURES
DIAGNOSTIC HOME SLEEP TEST (HST) REPORT WatchPAT      PATIENT ID:  NAME:  Toro Graves  MRN:               [id number]  YOB: 1970  DATE OF STUDY: 12/4/2024      Impression:     This study shows evidence of:      1. Potential Mild obstructive sleep apnea with PAT apnea hypopnea index(pAHI) of 2.6 per hour and  PAT respiratory disturbance index (pRDI) was 8.4 per hour. These findings are based on 7 channels recording of PAT signal with sleep staging, heart rate, pulse oximetry, actigraphy, body position, snoring and respiratory movement.     2. Oxygenation O2 Sat. mean O2 sat was 95%,  greg was 86%,  and maximum O2 at 99 %. O2 sat was at or  below 88% for 0.0 min of evaluation time. Oxygen Desaturation (>=4%) Index was 0.9/hr. AVG HR was 70 BPM.      TECHNICAL DESCRIPTION: Patient underwent home sleep apnea testing with peripheral arterial tone signal (WatchPAT™). This is a Type IV portable monitor and device per Medicare. Monitoring was done with 7 channels recording of PAT signal with sleep staging, heart rate, pulse oximetry, actigraphy, body position, snoring and respiratory movement. Prior to using the device, the patient received verbal and written instructions for its application and was provided with the help desk phone number for additional telephonic instruction with 24-hour availability of qualified personnel to answer questions.    Respiratory events:        General sleep summary: . Total recording time is 8 hours and 0 minutes and total Sleep time is 6 hours and 43 minutes. The patient spent 75.5 minutes in the supine position and 328 minutes in the nonsupine position.      Recommendations:    1.  Does not meet criteria for obstructive sleep apnea with an overall AHI of 2.6 events an hour (less than 5 is considered normal).  However, RDI is slightly elevated at 8.4 which could signify mild obstructive sleep apnea.  If suspicion of sleep apnea persist patient may benefit from  undergoing in lab polysomnography to completely rule out sleep apnea or diagnose if present.  Home studies can underestimate the severity of obstructive sleep apnea in certain cases.  2. In general patients with sleep apnea are advised to avoid alcohol and sedatives and to not operate a motor vehicle while drowsy. In some cases alternative treatment options may prove effective in resolving sleep apnea in these options include upper airway surgery, the use of a dental orthotic or weight loss and positional therapy. Clinical correlation is required.         Dat Rush MD

## 2025-01-24 ENCOUNTER — OFFICE VISIT (OUTPATIENT)
Dept: URGENT CARE | Facility: PHYSICIAN GROUP | Age: 55
End: 2025-01-24
Payer: COMMERCIAL

## 2025-01-24 VITALS
RESPIRATION RATE: 18 BRPM | OXYGEN SATURATION: 98 % | BODY MASS INDEX: 31.83 KG/M2 | SYSTOLIC BLOOD PRESSURE: 116 MMHG | TEMPERATURE: 97.7 F | HEIGHT: 72 IN | HEART RATE: 98 BPM | WEIGHT: 235 LBS | DIASTOLIC BLOOD PRESSURE: 80 MMHG

## 2025-01-24 DIAGNOSIS — J06.9 VIRAL URI WITH COUGH: ICD-10-CM

## 2025-01-24 DIAGNOSIS — J44.1 COPD WITH ACUTE EXACERBATION (HCC): ICD-10-CM

## 2025-01-24 LAB
FLUAV RNA SPEC QL NAA+PROBE: NEGATIVE
FLUBV RNA SPEC QL NAA+PROBE: NEGATIVE
RSV RNA SPEC QL NAA+PROBE: NEGATIVE
SARS-COV-2 RNA RESP QL NAA+PROBE: NEGATIVE

## 2025-01-24 PROCEDURE — 99214 OFFICE O/P EST MOD 30 MIN: CPT | Performed by: PHYSICIAN ASSISTANT

## 2025-01-24 PROCEDURE — 3074F SYST BP LT 130 MM HG: CPT | Performed by: PHYSICIAN ASSISTANT

## 2025-01-24 PROCEDURE — 3079F DIAST BP 80-89 MM HG: CPT | Performed by: PHYSICIAN ASSISTANT

## 2025-01-24 PROCEDURE — 0241U POCT CEPHEID COV-2, FLU A/B, RSV - PCR: CPT | Performed by: PHYSICIAN ASSISTANT

## 2025-01-24 RX ORDER — PREDNISONE 10 MG/1
40 TABLET ORAL DAILY
Qty: 20 TABLET | Refills: 0 | Status: SHIPPED | OUTPATIENT
Start: 2025-01-24 | End: 2025-01-29

## 2025-01-24 RX ORDER — AZITHROMYCIN 250 MG/1
TABLET, FILM COATED ORAL
Qty: 6 TABLET | Refills: 0 | Status: SHIPPED | OUTPATIENT
Start: 2025-01-24

## 2025-01-24 ASSESSMENT — ENCOUNTER SYMPTOMS
COUGH: 1
MYALGIAS: 0
DIARRHEA: 0
EYE PAIN: 0
CHILLS: 0
SPUTUM PRODUCTION: 1
NAUSEA: 0
FEVER: 0
HEADACHES: 0
ABDOMINAL PAIN: 0
CONSTIPATION: 0
SHORTNESS OF BREATH: 1
SORE THROAT: 0
VOMITING: 0

## 2025-01-24 ASSESSMENT — FIBROSIS 4 INDEX: FIB4 SCORE: 0.81

## 2025-01-24 NOTE — LETTER
January 24, 2025    To Whom It May Concern:         This is confirmation that Toro Graves attended his scheduled appointment with Eusebio Wyatt P.A.-C. on 1/24/25.  Please excuse this patient from work today and tomorrow due to illness. He's cleared to return thereafter.          If you have any questions please do not hesitate to call me at the phone number listed below.    Sincerely,          Eusebio Wyatt P.A.-C.  [phone]

## 2025-01-24 NOTE — PROGRESS NOTES
Subjective:   Toro Graves is a 54 y.o. male who presents for Sinusitis (Sinus pressure, cough, ear ache/X 1 day )      54 years old male with history of copd presents for 1 day history of sinus congestion, ear pressure, and cough worsening today.  Felt short of breath last night, and had relief with inhaler.     Review of Systems   Constitutional:  Positive for malaise/fatigue. Negative for chills and fever.   HENT:  Positive for congestion. Negative for ear pain and sore throat.    Eyes:  Negative for pain.   Respiratory:  Positive for cough, sputum production and shortness of breath.    Cardiovascular:  Negative for chest pain.   Gastrointestinal:  Negative for abdominal pain, constipation, diarrhea, nausea and vomiting.   Genitourinary:  Negative for dysuria.   Musculoskeletal:  Negative for myalgias.   Skin:  Negative for rash.   Neurological:  Negative for headaches.       Medications, Allergies, and current problem list reviewed today in Epic.     Objective:     /80   Pulse 98   Temp 36.5 °C (97.7 °F) (Temporal)   Resp 18   Ht 1.829 m (6')   Wt 107 kg (235 lb)   SpO2 98%     Physical Exam  Vitals reviewed.   Constitutional:       Appearance: Normal appearance.   HENT:      Head: Normocephalic and atraumatic.      Right Ear: Tympanic membrane, ear canal and external ear normal.      Left Ear: Tympanic membrane, ear canal and external ear normal.      Nose: Congestion and rhinorrhea present.      Mouth/Throat:      Mouth: Mucous membranes are moist.      Pharynx: Oropharynx is clear.   Eyes:      Conjunctiva/sclera: Conjunctivae normal.      Pupils: Pupils are equal, round, and reactive to light.   Cardiovascular:      Rate and Rhythm: Normal rate and regular rhythm.   Pulmonary:      Effort: Pulmonary effort is normal.      Breath sounds: Normal breath sounds. No wheezing, rhonchi or rales.   Musculoskeletal:      Cervical back: Normal range of motion.   Lymphadenopathy:      Cervical:  No cervical adenopathy.   Skin:     General: Skin is warm and dry.      Capillary Refill: Capillary refill takes less than 2 seconds.   Neurological:      Mental Status: He is alert and oriented to person, place, and time.         Assessment/Plan:     Diagnosis and associated orders:     1. Viral URI with cough        2. COPD with acute exacerbation (HCC)  predniSONE (DELTASONE) 10 MG Tab    azithromycin (ZITHROMAX) 250 MG Tab    POCT CEPHEID COV-2, FLU A/B, RSV - PCR         Comments/MDM:     Viral panel negative.  Encouraged patient to regularly use his albuterol at home, will send prednisone and azithromycin but encouraged the patient to wait and if he notes the cough worsens, has a change in sputum production, her symptoms are persisting beyond 5 days with cough he may start these antibiotics although currently I would not describe him as being is in exacerbation.  However his symptoms have been present for a very short time and this may change in the coming days.  Discussed indications for follow-up.         Differential diagnosis, natural history, supportive care, and indications for immediate follow-up discussed.    Advised the patient to follow-up with the primary care physician for recheck, reevaluation, and consideration of further management.    Please note that this dictation was created using voice recognition software. I have made a reasonable attempt to correct obvious errors, but I expect that there are errors of grammar and possibly content that I did not discover before finalizing the note.    This note was electronically signed by Eusebio Wyatt PA-C

## 2025-03-16 DIAGNOSIS — J44.9 OBSTRUCTIVE LUNG DISEASE (GENERALIZED) (HCC): ICD-10-CM

## 2025-03-17 RX ORDER — FLUTICASONE FUROATE, UMECLIDINIUM BROMIDE AND VILANTEROL TRIFENATATE 200; 62.5; 25 UG/1; UG/1; UG/1
1 POWDER RESPIRATORY (INHALATION) DAILY
Qty: 60 EACH | Refills: 0 | Status: SHIPPED | OUTPATIENT
Start: 2025-03-17

## 2025-03-17 NOTE — TELEPHONE ENCOUNTER
Received request via: Pharmacy    Was the patient seen in the last year in this department? Yes    Does the patient have an active prescription (recently filled or refills available) for medication(s) requested? No    Pharmacy Name: [facility]    Does the patient have long-term Plus and need 100-day supply? (This applies to ALL medications) Patient does not have SCP

## 2025-04-05 ENCOUNTER — OFFICE VISIT (OUTPATIENT)
Dept: URGENT CARE | Facility: PHYSICIAN GROUP | Age: 55
End: 2025-04-05
Payer: COMMERCIAL

## 2025-04-05 VITALS
HEART RATE: 77 BPM | OXYGEN SATURATION: 98 % | DIASTOLIC BLOOD PRESSURE: 76 MMHG | RESPIRATION RATE: 16 BRPM | HEIGHT: 72 IN | WEIGHT: 235 LBS | TEMPERATURE: 97.4 F | SYSTOLIC BLOOD PRESSURE: 116 MMHG | BODY MASS INDEX: 31.83 KG/M2

## 2025-04-05 DIAGNOSIS — J98.01 BRONCHOSPASM, ACUTE: ICD-10-CM

## 2025-04-05 DIAGNOSIS — J44.1 COPD WITH ACUTE EXACERBATION (HCC): Primary | ICD-10-CM

## 2025-04-05 PROCEDURE — 99214 OFFICE O/P EST MOD 30 MIN: CPT | Mod: 25 | Performed by: PHYSICIAN ASSISTANT

## 2025-04-05 PROCEDURE — 94640 AIRWAY INHALATION TREATMENT: CPT | Performed by: PHYSICIAN ASSISTANT

## 2025-04-05 PROCEDURE — 3078F DIAST BP <80 MM HG: CPT | Performed by: PHYSICIAN ASSISTANT

## 2025-04-05 PROCEDURE — 3074F SYST BP LT 130 MM HG: CPT | Performed by: PHYSICIAN ASSISTANT

## 2025-04-05 RX ORDER — IPRATROPIUM BROMIDE AND ALBUTEROL SULFATE 2.5; .5 MG/3ML; MG/3ML
3 SOLUTION RESPIRATORY (INHALATION) 4 TIMES DAILY
Qty: 90 ML | Refills: 0 | Status: SHIPPED | OUTPATIENT
Start: 2025-04-05

## 2025-04-05 RX ORDER — IPRATROPIUM BROMIDE AND ALBUTEROL SULFATE 2.5; .5 MG/3ML; MG/3ML
3 SOLUTION RESPIRATORY (INHALATION) ONCE
Status: COMPLETED | OUTPATIENT
Start: 2025-04-05 | End: 2025-04-05

## 2025-04-05 RX ORDER — AZITHROMYCIN 250 MG/1
TABLET, FILM COATED ORAL
Qty: 6 TABLET | Refills: 0 | Status: SHIPPED | OUTPATIENT
Start: 2025-04-05

## 2025-04-05 RX ORDER — BENZONATATE 100 MG/1
100 CAPSULE ORAL 3 TIMES DAILY PRN
Qty: 60 CAPSULE | Refills: 0 | Status: SHIPPED | OUTPATIENT
Start: 2025-04-05

## 2025-04-05 RX ORDER — PREDNISONE 20 MG/1
20 TABLET ORAL 2 TIMES DAILY
Qty: 10 TABLET | Refills: 0 | Status: SHIPPED | OUTPATIENT
Start: 2025-04-05 | End: 2025-04-10

## 2025-04-05 RX ADMIN — IPRATROPIUM BROMIDE AND ALBUTEROL SULFATE 3 ML: 2.5; .5 SOLUTION RESPIRATORY (INHALATION) at 10:09

## 2025-04-05 ASSESSMENT — ENCOUNTER SYMPTOMS
CHILLS: 0
SHORTNESS OF BREATH: 1
COUGH: 1
WHEEZING: 1
SPUTUM PRODUCTION: 1
FEVER: 0

## 2025-04-05 ASSESSMENT — FIBROSIS 4 INDEX: FIB4 SCORE: 0.83

## 2025-04-05 NOTE — PROGRESS NOTES
Subjective     Toro Graves is a 55 y.o. male who presents with Cough (X 5 days, worsening, SOB, phlegm )    PMH:  has a past medical history of Allergy, Asthma, and Bronchitis.  MEDS:   Current Outpatient Medications:     predniSONE (DELTASONE) 20 MG Tab, Take 1 Tablet by mouth 2 times a day for 5 days., Disp: 10 Tablet, Rfl: 0    azithromycin (ZITHROMAX) 250 MG Tab, Take 2 tabs by mouth once today, then one tab by mouth once daily days 2-5.  Complete all medication., Disp: 6 Tablet, Rfl: 0    ipratropium-albuterol (DUONEB) 0.5-2.5 (3) MG/3ML nebulizer solution, Take 3 mL by nebulization 4 times a day., Disp: 90 mL, Rfl: 0    benzonatate (TESSALON) 100 MG Cap, Take 1 Capsule by mouth 3 times a day as needed for Cough., Disp: 60 Capsule, Rfl: 0    TRELEGY ELLIPTA 200-62.5-25 MCG/ACT inhaler, Inhale 1 puff by mouth once daily, Disp: 60 Each, Rfl: 0    albuterol 108 (90 Base) MCG/ACT Aero Soln inhalation aerosol, Inhale 2 Puffs every four hours as needed for Shortness of Breath., Disp: 1 Each, Rfl: 3    cetirizine (ZYRTEC) 10 MG Tab, Take 10 mg by mouth every day., Disp: , Rfl:     naproxen (NAPROSYN) 500 MG Tab, Take 1 Tablet by mouth 2 times a day with meals. (Patient not taking: Reported on 4/5/2025), Disp: 30 Tablet, Rfl: 0  ALLERGIES:   Allergies   Allergen Reactions    Pcn [Penicillins] Anaphylaxis     Per patient     SURGHX: History reviewed. No pertinent surgical history.  SOCHX:  reports that he quit smoking about 16 years ago. His smoking use included cigarettes. He started smoking about 46 years ago. He has a 30 pack-year smoking history. He has never used smokeless tobacco. He reports that he does not drink alcohol and does not use drugs.  FH: Reviewed with patient, not pertinent to this visit.           Patient presents with history of COPD presents to clinic today with complaint of productive cough of thick and discolored sputum and worsening wheezing for the last 5 days.  Patient has been  using his albuterol inhaler as well as his Trelegy with no improvement in his symptoms.  Patient has tried his wife's nebulizer medication and it has improved his symptoms but he does not have any medication of his own.  No other complaints.     Cough  Associated symptoms include shortness of breath and wheezing. Pertinent negatives include no chills or fever.       Review of Systems   Constitutional:  Negative for chills and fever.   Respiratory:  Positive for cough, sputum production, shortness of breath and wheezing.    All other systems reviewed and are negative.             Objective     /76   Pulse 77   Temp 36.3 °C (97.4 °F) (Temporal)   Resp 16   Ht 1.829 m (6')   Wt 107 kg (235 lb)   SpO2 98%   BMI 31.87 kg/m²      Physical Exam  Vitals and nursing note reviewed.   Constitutional:       General: He is not in acute distress.     Appearance: He is well-developed. He is not toxic-appearing.   HENT:      Head: Normocephalic and atraumatic.      Right Ear: Tympanic membrane normal.      Left Ear: Tympanic membrane normal.      Nose: Mucosal edema present.      Mouth/Throat:      Lips: Pink.      Mouth: Mucous membranes are moist.      Pharynx: Uvula midline. No uvula swelling.   Eyes:      Extraocular Movements: Extraocular movements intact.      Conjunctiva/sclera: Conjunctivae normal.      Pupils: Pupils are equal, round, and reactive to light.   Cardiovascular:      Rate and Rhythm: Normal rate and regular rhythm.      Heart sounds: Normal heart sounds.   Pulmonary:      Effort: Pulmonary effort is normal.      Breath sounds: Wheezing present. No decreased breath sounds or rales.      Comments: Coarse productive cough  Abdominal:      Palpations: Abdomen is soft.   Musculoskeletal:         General: Normal range of motion.      Cervical back: Normal range of motion and neck supple.   Lymphadenopathy:      Cervical: No cervical adenopathy.   Skin:     General: Skin is warm and dry.      Capillary  Refill: Capillary refill takes less than 2 seconds.   Neurological:      General: No focal deficit present.      Mental Status: He is alert and oriented to person, place, and time.      Gait: Gait normal.   Psychiatric:         Mood and Affect: Mood normal.         Behavior: Behavior is cooperative.                                  Assessment & Plan  COPD with acute exacerbation (HCC)    Orders:    ipratropium-albuterol (DUONEB) nebulizer solution    predniSONE (DELTASONE) 20 MG Tab; Take 1 Tablet by mouth 2 times a day for 5 days.    azithromycin (ZITHROMAX) 250 MG Tab; Take 2 tabs by mouth once today, then one tab by mouth once daily days 2-5.  Complete all medication.    ipratropium-albuterol (DUONEB) 0.5-2.5 (3) MG/3ML nebulizer solution; Take 3 mL by nebulization 4 times a day.    benzonatate (TESSALON) 100 MG Cap; Take 1 Capsule by mouth 3 times a day as needed for Cough.    Bronchospasm, acute    Orders:    ipratropium-albuterol (DUONEB) nebulizer solution    predniSONE (DELTASONE) 20 MG Tab; Take 1 Tablet by mouth 2 times a day for 5 days.    azithromycin (ZITHROMAX) 250 MG Tab; Take 2 tabs by mouth once today, then one tab by mouth once daily days 2-5.  Complete all medication.    ipratropium-albuterol (DUONEB) 0.5-2.5 (3) MG/3ML nebulizer solution; Take 3 mL by nebulization 4 times a day.    benzonatate (TESSALON) 100 MG Cap; Take 1 Capsule by mouth 3 times a day as needed for Cough.       Patient is clinically stable in the UC at this time. Pt is in no acute distress and is appropriate for outpatient treatment at this time.     Pt states symptoms have improved after neb treatment, on re-eval wheezing has improved and air movement better throughout.     PT HPI and PE are consistent with acute COPD exacerbation and bronchospasm.  I will treat with prednisone, duoneb, tessalon and zithromax.      Differential diagnosis, supportive care, and indications for immediate follow-up discussed with patient.   Instructed to return to clinic or nearest emergency department for any change in condition, further concerns, or worsening of symptoms.    I personally reviewed prior external notes and test results pertinent to today's visit.  I have independently reviewed and interpreted all diagnostics ordered during this urgent care visit.    PT should follow up with PCP in 1-2 days for re-evaluation if symptoms have not improved.      Discussed red flags and reasons to return to UC or ED.      Pt and/or family verbalized understanding of diagnosis and follow up instructions and was offered informational handout on diagnosis.  PT discharged.     Please note that this dictation was created using voice recognition software. I have made every reasonable attempt to correct obvious errors, but I expect that there may be errors of grammar and possibly content that I did not discover before finalizing the note.

## 2025-04-13 DIAGNOSIS — J44.9 OBSTRUCTIVE LUNG DISEASE (GENERALIZED) (HCC): ICD-10-CM

## 2025-04-14 NOTE — TELEPHONE ENCOUNTER
Received request via: Pharmacy    Was the patient seen in the last year in this department? Yes    Does the patient have an active prescription (recently filled or refills available) for medication(s) requested? No    Pharmacy Name: [facility]    Does the patient have assisted Plus and need 100-day supply? (This applies to ALL medications) Patient does not have SCP

## 2025-04-15 RX ORDER — FLUTICASONE FUROATE, UMECLIDINIUM BROMIDE AND VILANTEROL TRIFENATATE 200; 62.5; 25 UG/1; UG/1; UG/1
1 POWDER RESPIRATORY (INHALATION) DAILY
Qty: 60 EACH | Refills: 2 | Status: SHIPPED | OUTPATIENT
Start: 2025-04-15

## 2025-05-06 ENCOUNTER — TELEPHONE (OUTPATIENT)
Dept: MEDICAL GROUP | Facility: LAB | Age: 55
End: 2025-05-06
Payer: COMMERCIAL

## 2025-05-06 DIAGNOSIS — J44.9 OBSTRUCTIVE LUNG DISEASE (GENERALIZED) (HCC): ICD-10-CM

## 2025-05-06 NOTE — TELEPHONE ENCOUNTER
Referral i need to see if your and get me referral for a pulmonary doctor preferably at [address] so I have discuss my condition copd and asthma with a specialist thank you I would like too see if there is any and to check to to see if I have sleep apena again that you hope to hear from you or your office soon my wife see dr Feliciano and she spoke with her about looking into a cap machine put

## 2025-05-19 ENCOUNTER — PATIENT MESSAGE (OUTPATIENT)
Dept: MEDICAL GROUP | Facility: LAB | Age: 55
End: 2025-05-19
Payer: COMMERCIAL

## 2025-05-19 DIAGNOSIS — Z91.89 AT RISK FOR SLEEP APNEA: Primary | ICD-10-CM

## 2025-06-02 ENCOUNTER — OFFICE VISIT (OUTPATIENT)
Dept: SLEEP MEDICINE | Facility: MEDICAL CENTER | Age: 55
End: 2025-06-02
Attending: PHYSICIAN ASSISTANT
Payer: COMMERCIAL

## 2025-06-02 VITALS
WEIGHT: 237.9 LBS | SYSTOLIC BLOOD PRESSURE: 130 MMHG | HEIGHT: 73 IN | RESPIRATION RATE: 14 BRPM | BODY MASS INDEX: 31.53 KG/M2 | HEART RATE: 71 BPM | OXYGEN SATURATION: 97 % | DIASTOLIC BLOOD PRESSURE: 70 MMHG

## 2025-06-02 DIAGNOSIS — R06.83 SNORING: ICD-10-CM

## 2025-06-02 DIAGNOSIS — J44.9 OBSTRUCTIVE LUNG DISEASE (GENERALIZED) (HCC): Primary | ICD-10-CM

## 2025-06-02 PROCEDURE — 99203 OFFICE O/P NEW LOW 30 MIN: CPT | Performed by: PHYSICIAN ASSISTANT

## 2025-06-02 PROCEDURE — 3075F SYST BP GE 130 - 139MM HG: CPT | Performed by: PHYSICIAN ASSISTANT

## 2025-06-02 PROCEDURE — 3078F DIAST BP <80 MM HG: CPT | Performed by: PHYSICIAN ASSISTANT

## 2025-06-02 PROCEDURE — 99213 OFFICE O/P EST LOW 20 MIN: CPT | Performed by: PHYSICIAN ASSISTANT

## 2025-06-02 ASSESSMENT — ENCOUNTER SYMPTOMS
TREMORS: 0
WHEEZING: 0
DIZZINESS: 0
FEVER: 0
SHORTNESS OF BREATH: 1
PALPITATIONS: 0
CHILLS: 0
HEARTBURN: 0
SINUS PAIN: 0
HEADACHES: 0
WEIGHT LOSS: 0
SORE THROAT: 0
ORTHOPNEA: 0
INSOMNIA: 1
SPUTUM PRODUCTION: 0
COUGH: 0

## 2025-06-02 ASSESSMENT — FIBROSIS 4 INDEX: FIB4 SCORE: 0.83

## 2025-06-02 NOTE — PATIENT INSTRUCTIONS
1-reviewed new patient information  2-reviewed home sleep study demonstrating no significant sleep apnea  3-previously mild to moderate for which bipap was recommended based on titration but patient did not tolerate  4-weight is down 14 lbs from previous study   5-weight loss is advised, side sleep is advised   6-consider positional sleep aid or elevating head of bed  7-follow up as needed

## 2025-06-02 NOTE — PROGRESS NOTES
"Chief Complaint   Patient presents with    New Patient     Ref by  DX: At risk for sleep apnea    HST 12/03/24    Last Seen 09/28/2020 with Dr. Schroeder  Split Night 12/29/2019  Previously on BiPAP 13/9 , [facility]       HPI:  Toro Graves is a 55 y.o. year old male here today for follow-up on sleep study results and [facility]  Patient previously evaluated by Dr. Devin Schroeder 9/28/2020.    Past Medical History: Former smoker, obstructive lung disease, obesity, allergies, nonseasonal allergic rhinitis, mixed dyslipidemia.    Vitals:  /70 (BP Location: Left arm, Patient Position: Sitting, BP Cuff Size: Large adult)   Pulse 71   Resp 14   Ht 1.854 m (6' 1\")   Wt 108 kg (237 lb 14.4 oz)   SpO2 97% BMI of 31.39 kg/m²    Recent Imaging: None    Polysomnogram obtained 12/29/2019 demonstrated an overall AHI of 16.3 increasing during REM sleep to 75.79/hr.  Low O2 sat of 79%.  Sleep fragmentation was noted, frequent periodic limb movements with PLM arousal index of 4.6 events per hour.  Findings consistent with mild to moderate sleep apnea.  Events occurred exclusively in supine position.  Met split-night criteria and patient was initiated on treatment with CPAP and BiPAP therapy.  Best response to BiPAP at 13/9 cm H2O pressure.    Patient was initiated on BiPAP therapy and failed to tolerate.    Overnight home sleep study obtained 12/3/2024 demonstrated possible mild obstructive sleep apnea with normal pAHI of 2.6 events per hour but P RDI of 8.4 events per hour.  Low O2 sat of 86% with sats less than or equal to 88% for 0 minutes of evaluated time.  Patient does report 14 pound weight difference since last study.  He does primarily side sleep.    Stop Bang Score 6 (11/4/2024  2:25 PM)     As per supplemental questionnaire to be scanned or imported into chart:    [place] Sleepiness Score: 6    Sleep Schedule  Bedtime: 2 AM-3 AM  Wake time: 7-8 " "a.m.  Sleep-onset latency: 5 min, sometimes longer  Awakenings from sleep: 0  Difficulty falling back asleep: Not applicable  Bedroom partner: yes  Naps: No     DAYTIME SYMPTOMS:   Excessive daytime sleepiness: No   Daytime fatigue: Yes sometimes   Difficulty concentrating: No   Memory problems: No   Irritability:No   Work/school performance issues: No   Sleepiness with driving: Yes sometimes   Caffeine/stimulant use: Yes one-two cups of coffee, 1-2 pepsi or ice tea in the afternoon  Alcohol use:No     SLEEP RELATED SYMPTOMS  Snoring: Yes  Witnessed apnea or gasping/choking: Yes  Dry mouth or mouth breathing: Yes occasional   Night Sweating: Yes sometimes   Teeth grinding/biting: Yes  Morning headaches: No   Refreshed Upon Awakening: No      SLEEP RELATED BEHAVIORS:  Parasomnias (walking, talking, eating, violence): No   Leg kicking: Yes  Restless legs - \"urge to move\": No   Nightmares: No  Recurrent: No   Dream enactment: No      NARCOLEPSY:  Cataplexy: No   Sleep paralysis: No   Sleep attacks: No   Hypnagogic/hypnopompic hallucinations: No         Review of Systems   Constitutional:  Negative for chills, fever, malaise/fatigue and weight loss.   HENT:  Positive for congestion (allergies). Negative for hearing loss, nosebleeds, sinus pain, sore throat and tinnitus.    Eyes:         Presc glasses    Respiratory:  Positive for shortness of breath (due to COPD). Negative for cough, sputum production and wheezing.    Cardiovascular:  Negative for chest pain, palpitations, orthopnea and leg swelling.   Gastrointestinal:  Negative for heartburn.        Upper dentures, missing some lower teeth, no swallowing issues    Neurological:  Negative for dizziness, tremors and headaches.   Psychiatric/Behavioral:  The patient has insomnia (staying asleep).        Past Medical History[1]    Past Surgical History[2]    Family History   Problem Relation Age of Onset    No Known Problems Mother     Diabetes Father     Heart Disease " Father     No Known Problems Brother     No Known Problems Brother        Social History     Socioeconomic History    Marital status:      Spouse name: Not on file    Number of children: Not on file    Years of education: Not on file    Highest education level: Not on file   Occupational History    Not on file   Tobacco Use    Smoking status: Former     Current packs/day: 0.00     Average packs/day: 1 pack/day for 30.0 years (30.0 ttl pk-yrs)     Types: Cigarettes     Start date: 1979     Quit date: 2009     Years since quittin.4    Smokeless tobacco: Never   Vaping Use    Vaping status: Never Used   Substance and Sexual Activity    Alcohol use: No    Drug use: No    Sexual activity: Yes     Partners: Female   Other Topics Concern    Not on file   Social History Narrative    Not on file     Social Drivers of Health     Financial Resource Strain: Patient Declined (2024)    Overall Financial Resource Strain (CARDIA)     Difficulty of Paying Living Expenses: Patient declined   Food Insecurity: Patient Declined (2024)    Hunger Vital Sign     Worried About Running Out of Food in the Last Year: Patient declined     Ran Out of Food in the Last Year: Patient declined   Transportation Needs: Patient Declined (2024)    PRAPARE - Transportation     Lack of Transportation (Medical): Patient declined     Lack of Transportation (Non-Medical): Patient declined   Physical Activity: Patient Declined (2024)    Exercise Vital Sign     Days of Exercise per Week: Patient declined     Minutes of Exercise per Session: Patient declined   Stress: Patient Declined (2024)    [employer] - Occupational Stress Questionnaire     Feeling of Stress : Patient declined   Social Connections: Unknown (2024)    Social Connection and Isolation Panel [NHANES]     Frequency of Communication with Friends and Family: Patient declined     Frequency of Social Gatherings with  Friends and Family: Patient declined     Attends Latter day Services: Patient declined     Active Member of Clubs or Organizations: No     Attends Club or Organization Meetings: Patient declined     Marital Status:    Intimate Partner Violence: Not on file   Housing Stability: Patient Declined (5/12/2024)    Housing Stability Vital Sign     Unable to Pay for Housing in the Last Year: Patient declined     Number of Places Lived in the Last Year: Not on file     Unstable Housing in the Last Year: Patient declined       Allergies as of 06/02/2025 - Reviewed 06/02/2025   Allergen Reaction Noted    Pcn [penicillins] Anaphylaxis 05/06/2019          Current medications as of today Current Medications[3]      Physical Exam:   Gen:           Alert and oriented, No apparent distress. Mood and affect appropriate, normal interaction with examiner.   Hearing:     Grossly intact.  Nose:          Normal, no lesions or deformities.  Dentition:    Poor dentition.   Oropharynx:   Tongue normal, posterior pharynx without erythema or exudate.  Mallampati Classification: III  Neck:        Supple, trachea midline, no masses.  Respiratory Effort: No intercostal retractions or use of accessory muscles.   Gait and Station: Grossly normal.  Digits and Nails: No clubbing, cyanosis, petechiae, or nodes.   Skin:        No rashes, lesions or ulcers noted.               Ext:           No cyanosis or edema.      Immunizations:  Flu: 10/14/2024  PCV 20: 8/19/2024  SARS CoV2 Vaccine: 10/28/2021, 4/20/2021, 3/30/2021    Assessment / Plan:  1. Obstructive lung disease (generalized) (HCC)     Patient was referred to pulmonary medicine by primary and is pending pulmonary follow-up for obstructive lung disease.    2. Snoring    Reviewed new patient information, evaluation for MICHAEL/sleep disordered breathing.  Reviewed home sleep study results demonstrating no significant sleep apnea, previous mild to moderate sleep apnea for which BiPAP was  recommended based on titration however patient did not tolerate and did not follow-up.  Note supine sleep on initial study and side sleep primarily on home study.  Also noted 14 pound weight decrease since previous study.  Continued weight loss is advised as well as side sleep.  Could consider positional sleep aid, wife reports elevating head of bed nearly eliminates patient's snoring.  If persistent or new onset of symptoms in-lab sleep study is recommended to reassess.  Patient states understanding.  Return to clinic as needed.    Follow-up:   Return if symptoms worsen or fail to improve, for Return with Jenna Valdivia PA-C.    Please note that this dictation was created using voice recognition software. I have made every reasonable attempt to correct obvious errors, but it is possible there are errors of grammar and possibly content that I did not discover before finalizing the note.         [1]   Past Medical History:  Diagnosis Date    Allergy     Asthma     Bronchitis     COPD (chronic obstructive pulmonary disease) ([facility])     Sleep apnea     Stomach ulcer     Wears glasses    [2] History reviewed. No pertinent surgical history.  [3]   Current Outpatient Medications   Medication Sig Dispense Refill    fluticasone-umeclidinium-vilanterol (TRELEGY ELLIPTA) 200-62.5-25 mcg/act inhaler Inhale 1 puff by mouth once daily 60 Each 2    ipratropium-albuterol (DUONEB) 0.5-2.5 (3) MG/3ML nebulizer solution Take 3 mL by nebulization 4 times a day. 90 mL 0    albuterol 108 (90 Base) MCG/ACT Aero Soln inhalation aerosol Inhale 2 Puffs every four hours as needed for Shortness of Breath. 1 Each 3    cetirizine (ZYRTEC) 10 MG Tab Take 10 mg by mouth every day.      azithromycin (ZITHROMAX) 250 MG Tab Take 2 tabs by mouth once today, then one tab by mouth once daily days 2-5.  Complete all medication. (Patient not taking: Reported on 6/2/2025) 6 Tablet 0    benzonatate (TESSALON) 100 MG Cap Take 1 Capsule by mouth 3 times a day  as needed for Cough. (Patient not taking: Reported on 6/2/2025) 60 Capsule 0    naproxen (NAPROSYN) 500 MG Tab Take 1 Tablet by mouth 2 times a day with meals. (Patient not taking: Reported on 1/24/2025) 30 Tablet 0     No current facility-administered medications for this visit.

## 2025-06-04 ENCOUNTER — OFFICE VISIT (OUTPATIENT)
Dept: SLEEP MEDICINE | Facility: MEDICAL CENTER | Age: 55
End: 2025-06-04
Attending: INTERNAL MEDICINE
Payer: COMMERCIAL

## 2025-06-04 VITALS
HEART RATE: 69 BPM | SYSTOLIC BLOOD PRESSURE: 116 MMHG | HEIGHT: 73 IN | DIASTOLIC BLOOD PRESSURE: 70 MMHG | WEIGHT: 237 LBS | BODY MASS INDEX: 31.41 KG/M2 | OXYGEN SATURATION: 96 %

## 2025-06-04 DIAGNOSIS — J44.9 OBSTRUCTIVE LUNG DISEASE (GENERALIZED) (HCC): Primary | ICD-10-CM

## 2025-06-04 DIAGNOSIS — Z87.891 FORMER SMOKER: ICD-10-CM

## 2025-06-04 DIAGNOSIS — T78.40XA ALLERGY, INITIAL ENCOUNTER: ICD-10-CM

## 2025-06-04 PROCEDURE — 3074F SYST BP LT 130 MM HG: CPT | Performed by: INTERNAL MEDICINE

## 2025-06-04 PROCEDURE — 3078F DIAST BP <80 MM HG: CPT | Performed by: INTERNAL MEDICINE

## 2025-06-04 PROCEDURE — 99204 OFFICE O/P NEW MOD 45 MIN: CPT | Performed by: INTERNAL MEDICINE

## 2025-06-04 PROCEDURE — 99215 OFFICE O/P EST HI 40 MIN: CPT | Performed by: INTERNAL MEDICINE

## 2025-06-04 RX ORDER — BUDESONIDE AND FORMOTEROL FUMARATE DIHYDRATE 80; 4.5 UG/1; UG/1
2 AEROSOL RESPIRATORY (INHALATION) 2 TIMES DAILY
Qty: 3 EACH | Refills: 3 | Status: SHIPPED | OUTPATIENT
Start: 2025-06-04

## 2025-06-04 RX ORDER — BUDESONIDE AND FORMOTEROL FUMARATE DIHYDRATE 80; 4.5 UG/1; UG/1
2 AEROSOL RESPIRATORY (INHALATION) 2 TIMES DAILY
COMMUNITY
End: 2025-06-04 | Stop reason: SDUPTHER

## 2025-06-04 RX ORDER — IPRATROPIUM BROMIDE AND ALBUTEROL SULFATE 2.5; .5 MG/3ML; MG/3ML
3 SOLUTION RESPIRATORY (INHALATION) EVERY 4 HOURS PRN
Qty: 120 ML | Refills: 11 | Status: SHIPPED | OUTPATIENT
Start: 2025-06-04

## 2025-06-04 ASSESSMENT — ENCOUNTER SYMPTOMS
HEARTBURN: 0
EYE REDNESS: 0
SPUTUM PRODUCTION: 0
SHORTNESS OF BREATH: 1
PHOTOPHOBIA: 0
PND: 0
HEMOPTYSIS: 0
RESPIRATORY SYMPTOMS COMMENTS: YES
COUGH: 0
WEAKNESS: 0
CHEST TIGHTNESS: 1
CONSTIPATION: 0
FOCAL WEAKNESS: 0
DIAPHORESIS: 0
DIZZINESS: 0
ORTHOPNEA: 0
TREMORS: 0
MYALGIAS: 0
CHILLS: 0
ABDOMINAL PAIN: 0
EYE PAIN: 0
WEIGHT LOSS: 0
BACK PAIN: 0
BLURRED VISION: 0
NAUSEA: 0
NECK PAIN: 0
SINUS PAIN: 0
CLAUDICATION: 0
FEVER: 0
DYSPNEA AT REST: 1
WHEEZING: 1
PALPITATIONS: 0
SORE THROAT: 0
FALLS: 0
HEADACHES: 0
SPEECH CHANGE: 0
DIARRHEA: 0
STRIDOR: 0
DEPRESSION: 0
DOUBLE VISION: 0
EYE DISCHARGE: 0
VOMITING: 0

## 2025-06-04 ASSESSMENT — FIBROSIS 4 INDEX: FIB4 SCORE: 0.83

## 2025-06-05 ENCOUNTER — HOSPITAL ENCOUNTER (OUTPATIENT)
Dept: LAB | Facility: MEDICAL CENTER | Age: 55
End: 2025-06-05
Attending: INTERNAL MEDICINE
Payer: COMMERCIAL

## 2025-06-05 DIAGNOSIS — J44.9 OBSTRUCTIVE LUNG DISEASE (GENERALIZED) (HCC): ICD-10-CM

## 2025-06-05 DIAGNOSIS — T78.40XA ALLERGY, INITIAL ENCOUNTER: ICD-10-CM

## 2025-06-05 LAB
BASOPHILS # BLD AUTO: 0.6 % (ref 0–1.8)
BASOPHILS # BLD: 0.04 K/UL (ref 0–0.12)
EOSINOPHIL # BLD AUTO: 0.26 K/UL (ref 0–0.51)
EOSINOPHIL NFR BLD: 3.7 % (ref 0–6.9)
ERYTHROCYTE [DISTWIDTH] IN BLOOD BY AUTOMATED COUNT: 37.2 FL (ref 35.9–50)
HCT VFR BLD AUTO: 44.4 % (ref 42–52)
HGB BLD-MCNC: 15.1 G/DL (ref 14–18)
IMM GRANULOCYTES # BLD AUTO: 0.03 K/UL (ref 0–0.11)
IMM GRANULOCYTES NFR BLD AUTO: 0.4 % (ref 0–0.9)
LYMPHOCYTES # BLD AUTO: 1.76 K/UL (ref 1–4.8)
LYMPHOCYTES NFR BLD: 24.9 % (ref 22–41)
MCH RBC QN AUTO: 29.1 PG (ref 27–33)
MCHC RBC AUTO-ENTMCNC: 34 G/DL (ref 32.3–36.5)
MCV RBC AUTO: 85.5 FL (ref 81.4–97.8)
MONOCYTES # BLD AUTO: 0.56 K/UL (ref 0–0.85)
MONOCYTES NFR BLD AUTO: 7.9 % (ref 0–13.4)
NEUTROPHILS # BLD AUTO: 4.41 K/UL (ref 1.82–7.42)
NEUTROPHILS NFR BLD: 62.5 % (ref 44–72)
NRBC # BLD AUTO: 0 K/UL
NRBC BLD-RTO: 0 /100 WBC (ref 0–0.2)
PLATELET # BLD AUTO: 287 K/UL (ref 164–446)
PMV BLD AUTO: 9.5 FL (ref 9–12.9)
RBC # BLD AUTO: 5.19 M/UL (ref 4.7–6.1)
WBC # BLD AUTO: 7.1 K/UL (ref 4.8–10.8)

## 2025-06-05 PROCEDURE — 36415 COLL VENOUS BLD VENIPUNCTURE: CPT

## 2025-06-05 PROCEDURE — 85025 COMPLETE CBC W/AUTO DIFF WBC: CPT

## 2025-06-05 PROCEDURE — 82785 ASSAY OF IGE: CPT

## 2025-06-05 NOTE — PROGRESS NOTES
Chief Complaint   Patient presents with    New Patient     REF BY DR. VARGAS FOR  Obstructive lung disease    Results      PFT 5/16/24         HPI: This patient is a 55 y.o. male presenting for evaluation of obstructive lung disease.  The patient's past medical history is fairly insignificant.  He does have a history of allergies and saw [facility] several years back but was never started on immunotherapy.  He does take Zyrtec and Flonase pretty much year-round.  He has been tested twice for sleep apnea but does not have significant sleep apnea.  He is a former tobacco smoker with 30-pack-year history.  He quit in 2009.  The patient has had intermittent wheezing for the past 2 years and pulmonary function testing from May 2024 showed airflow obstruction with robust bronchodilator response, normal total lung capacity and preserved DLCO.  He has been prescribed Breyna 80 and was initially only taking 1 puff daily but continued to need his rescue inhaler.  He was trialed on Trelegy but this caused body aches and joint pains so he stopped 1 week ago and symptoms improved.  He is back on Breyna 80 but taking 2 puffs in the morning.  This does help temporarily.  In January of this year he was treated with a course of prednisone, azithromycin and nebulized bronchodilators which gave him significant relief.  He currently works as an  at the [employer] and symptoms are exacerbated by activity.  He gets short of breath just walking to his car.  No chest pain      Past Medical History[1]    Social History     Socioeconomic History    Marital status:      Spouse name: Not on file    Number of children: Not on file    Years of education: Not on file    Highest education level: Not on file   Occupational History    Not on file   Tobacco Use    Smoking status: Former     Current packs/day: 0.00     Average packs/day: 1 pack/day for 30.0 years (30.0 ttl pk-yrs)     Types: Cigarettes     Start date: 1/1/1979      Quit date: 2009     Years since quittin.4    Smokeless tobacco: Never   Vaping Use    Vaping status: Never Used   Substance and Sexual Activity    Alcohol use: No    Drug use: No    Sexual activity: Yes     Partners: Female   Other Topics Concern    Not on file   Social History Narrative    Not on file     Social Drivers of Health     Financial Resource Strain: Patient Declined (2024)    Overall Financial Resource Strain (CARDIA)     Difficulty of Paying Living Expenses: Patient declined   Food Insecurity: Patient Declined (2024)    Hunger Vital Sign     Worried About Running Out of Food in the Last Year: Patient declined     Ran Out of Food in the Last Year: Patient declined   Transportation Needs: Patient Declined (2024)    PRAPARE - Transportation     Lack of Transportation (Medical): Patient declined     Lack of Transportation (Non-Medical): Patient declined   Physical Activity: Patient Declined (2024)    Exercise Vital Sign     Days of Exercise per Week: Patient declined     Minutes of Exercise per Session: Patient declined   Stress: Patient Declined (2024)    [employer] - Occupational Stress Questionnaire     Feeling of Stress : Patient declined   Social Connections: Unknown (2024)    Social Connection and Isolation Panel [NHANES]     Frequency of Communication with Friends and Family: Patient declined     Frequency of Social Gatherings with Friends and Family: Patient declined     Attends Restoration Services: Patient declined     Active Member of Clubs or Organizations: No     Attends Club or Organization Meetings: Patient declined     Marital Status:    Intimate Partner Violence: Not on file   Housing Stability: Patient Declined (2024)    Housing Stability Vital Sign     Unable to Pay for Housing in the Last Year: Patient declined     Number of Places Lived in the Last Year: Not on file     Unstable Housing in the Last Year:  "Patient declined       Family History   Problem Relation Age of Onset    No Known Problems Mother     Diabetes Father     Heart Disease Father     No Known Problems Brother     No Known Problems Brother        Medications Ordered Prior to Encounter[2]    Allergies: Pcn [penicillins]    ROS:   Review of Systems   Constitutional:  Negative for chills, diaphoresis, fever, malaise/fatigue and weight loss.   HENT:  Negative for congestion, ear discharge, ear pain, hearing loss, nosebleeds, sinus pain, sore throat and tinnitus.    Eyes:  Negative for blurred vision, double vision, photophobia, pain, discharge and redness.   Respiratory:  Positive for shortness of breath and wheezing. Negative for cough, hemoptysis, sputum production and stridor.    Cardiovascular:  Negative for chest pain, palpitations, orthopnea, claudication, leg swelling and PND.   Gastrointestinal:  Negative for abdominal pain, constipation, diarrhea, heartburn, nausea and vomiting.   Genitourinary:  Negative for dysuria and urgency.   Musculoskeletal:  Negative for back pain, falls, joint pain, myalgias and neck pain.   Skin:  Negative for itching and rash.   Neurological:  Negative for dizziness, tremors, speech change, focal weakness, weakness and headaches.   Endo/Heme/Allergies:  Negative for environmental allergies.   Psychiatric/Behavioral:  Negative for depression.        /70 (BP Location: Right arm, Patient Position: Sitting, BP Cuff Size: Adult)   Pulse 69   Ht 1.854 m (6' 1\")   Wt 108 kg (237 lb)   SpO2 96%     Physical Exam:  Physical Exam  Vitals reviewed.   Constitutional:       General: He is not in acute distress.     Appearance: Normal appearance.   HENT:      Head: Normocephalic and atraumatic.      Right Ear: External ear normal.      Left Ear: External ear normal.      Nose: Nose normal. No congestion.      Mouth/Throat:      Mouth: Mucous membranes are moist.      Pharynx: Oropharynx is clear. No oropharyngeal exudate. "   Eyes:      General: No scleral icterus.     Extraocular Movements: Extraocular movements intact.      Conjunctiva/sclera: Conjunctivae normal.      Pupils: Pupils are equal, round, and reactive to light.   Cardiovascular:      Rate and Rhythm: Normal rate and regular rhythm.      Heart sounds: Normal heart sounds. No murmur heard.     No gallop.   Pulmonary:      Effort: Pulmonary effort is normal. No respiratory distress.      Breath sounds: Normal breath sounds. No wheezing or rales.   Abdominal:      Palpations: Abdomen is soft.   Musculoskeletal:         General: Normal range of motion.      Cervical back: Normal range of motion and neck supple.      Right lower leg: No edema.      Left lower leg: No edema.   Skin:     General: Skin is warm and dry.      Findings: No rash.   Neurological:      General: No focal deficit present.      Mental Status: He is alert and oriented to person, place, and time.      Cranial Nerves: No cranial nerve deficit.   Psychiatric:         Mood and Affect: Mood normal.         Behavior: Behavior normal.         PFTs as reviewed by me personally: As per HPI    Imaging as reviewed by me personally: None    Assessment:  1. Obstructive lung disease (generalized) (HCC)  DME Nebulizer    ipratropium-albuterol (DUONEB) 0.5-2.5 (3) MG/3ML nebulizer solution    budesonide-formoterol (BREYNA) 80-4.5 MCG/ACT Aerosol    CBC WITH DIFFERENTIAL    IGE [facility]OTAL [facility] IMMUNOCAP    PULMONARY FUNCTION TESTS -Test requested: Complete Pulmonary Function Test      2. Former smoker        3. Allergy, initial encounter  CBC WITH DIFFERENTIAL    IGE TOTAL [facility] IMMUNOCAP          Plan:  This patient is presenting with obstructive lung disease with significant bronchodilator response likely clinically more consistent with asthma than COPD.  He certainly can use rescue therapy on top of controller therapy and I will have him start by using Breyna 82 puffs twice daily.  He will be prescribed nebulized  bronchodilators to be used as needed in addition to albuterol.  We will send CBC with differential to evaluate for eosinophilia as well as serum IgE level.  Update pulmonary function testing.  Pending response we can add LAMA.  Consider referral back to allergy.  Tobacco free for greater than 15 years.  Chronic diagnosis but new to me.  I do not have his allergy testing but sounds like they are at least moderate and he may benefit from immunotherapy particularly if we cannot get good control of his obstructive lung disease.  Return in about 3 months (around 9/4/2025) for PFT at time of f/u.             [1]   Past Medical History:  Diagnosis Date    Allergy     Asthma     Bronchitis     COPD (chronic obstructive pulmonary disease) ([facility])     Sleep apnea     Stomach ulcer     Wears glasses    [2]   Current Outpatient Medications on File Prior to Visit   Medication Sig Dispense Refill    ipratropium-albuterol (DUONEB) 0.5-2.5 (3) MG/3ML nebulizer solution Take 3 mL by nebulization 4 times a day. 90 mL 0    benzonatate (TESSALON) 100 MG Cap Take 1 Capsule by mouth 3 times a day as needed for Cough. 60 Capsule 0    albuterol 108 (90 Base) MCG/ACT Aero Soln inhalation aerosol Inhale 2 Puffs every four hours as needed for Shortness of Breath. 1 Each 3    cetirizine (ZYRTEC) 10 MG Tab Take 10 mg by mouth every day.      azithromycin (ZITHROMAX) 250 MG Tab Take 2 tabs by mouth once today, then one tab by mouth once daily days 2-5.  Complete all medication. (Patient not taking: Reported on 6/4/2025) 6 Tablet 0    naproxen (NAPROSYN) 500 MG Tab Take 1 Tablet by mouth 2 times a day with meals. (Patient not taking: Reported on 6/4/2025) 30 Tablet 0     No current facility-administered medications on file prior to visit.

## 2025-06-07 LAB — IGE SERPL-ACNC: 1136 KU/L

## 2025-06-09 ENCOUNTER — TELEPHONE (OUTPATIENT)
Dept: SLEEP MEDICINE | Facility: MEDICAL CENTER | Age: 55
End: 2025-06-09
Payer: COMMERCIAL

## 2025-06-09 NOTE — TELEPHONE ENCOUNTER
Pt stating [facility] is out of network. Please send RX for nebulizer to [facility] on [address]. Please send all future DME requests to [facility].    [facility]  [address], [place] [address] ·   Phone: [phone]

## 2025-06-10 NOTE — TELEPHONE ENCOUNTER
Called patient back. Informed him I was going to re send his nebulizer Rx to another DME company in [facility] called [facility].    Patient understood.

## 2025-06-11 DIAGNOSIS — J44.9 OBSTRUCTIVE LUNG DISEASE (GENERALIZED) (HCC): ICD-10-CM

## 2025-06-13 ENCOUNTER — NON-PROVIDER VISIT (OUTPATIENT)
Dept: SLEEP MEDICINE | Facility: MEDICAL CENTER | Age: 55
End: 2025-06-13
Attending: INTERNAL MEDICINE
Payer: COMMERCIAL

## 2025-06-13 DIAGNOSIS — J44.9 OBSTRUCTIVE LUNG DISEASE (GENERALIZED) (HCC): ICD-10-CM

## 2025-06-13 PROCEDURE — 94729 DIFFUSING CAPACITY: CPT | Mod: 26 | Performed by: STUDENT IN AN ORGANIZED HEALTH CARE EDUCATION/TRAINING PROGRAM

## 2025-06-13 PROCEDURE — 94726 PLETHYSMOGRAPHY LUNG VOLUMES: CPT | Performed by: INTERNAL MEDICINE

## 2025-06-13 PROCEDURE — 94060 EVALUATION OF WHEEZING: CPT | Performed by: INTERNAL MEDICINE

## 2025-06-13 PROCEDURE — 94729 DIFFUSING CAPACITY: CPT | Performed by: INTERNAL MEDICINE

## 2025-06-13 PROCEDURE — 94060 EVALUATION OF WHEEZING: CPT | Mod: 26 | Performed by: STUDENT IN AN ORGANIZED HEALTH CARE EDUCATION/TRAINING PROGRAM

## 2025-06-13 PROCEDURE — 94726 PLETHYSMOGRAPHY LUNG VOLUMES: CPT | Mod: 26 | Performed by: STUDENT IN AN ORGANIZED HEALTH CARE EDUCATION/TRAINING PROGRAM

## 2025-06-13 RX ORDER — ALBUTEROL SULFATE 90 UG/1
2 INHALANT RESPIRATORY (INHALATION) EVERY 4 HOURS PRN
Qty: 18 G | Refills: 3 | Status: SHIPPED | OUTPATIENT
Start: 2025-06-13

## 2025-06-13 NOTE — PROCEDURES
Tech RRT Job Tracey notes; Good patient effort and cooperation.  The results of this test meet the ATS/ERS standards for acceptability.  Two puffs of albuterol were given via spacer.  DLCO performed during dilation    Interpretation;  Spirometry shows mild obstructive defect with near significant bronchodilator response  Mildly reduced lung volumes with no significant air trapping  DLCO within normal limits